# Patient Record
Sex: FEMALE | Race: BLACK OR AFRICAN AMERICAN | Employment: OTHER | URBAN - NONMETROPOLITAN AREA
[De-identification: names, ages, dates, MRNs, and addresses within clinical notes are randomized per-mention and may not be internally consistent; named-entity substitution may affect disease eponyms.]

---

## 2018-03-12 ENCOUNTER — HOSPITAL ENCOUNTER (OUTPATIENT)
Dept: MRI IMAGING | Age: 73
Discharge: HOME | End: 2018-03-12
Payer: MEDICARE

## 2018-03-12 DIAGNOSIS — Z00.6 EXAMINATION FOR NORMAL COMPARISON FOR CLINICAL RESEARCH: ICD-10-CM

## 2018-03-12 PROCEDURE — 3209999900 MRI COMPARISON OF OUTSIDE FILMS

## 2018-03-19 ENCOUNTER — HOSPITAL ENCOUNTER (OUTPATIENT)
Dept: INTERVENTIONAL RADIOLOGY/VASCULAR | Age: 73
Discharge: HOME | End: 2018-03-19
Payer: MEDICARE

## 2018-03-19 DIAGNOSIS — M54.6 ACUTE MIDLINE THORACIC BACK PAIN: ICD-10-CM

## 2018-03-19 PROCEDURE — 76000 FLUOROSCOPY <1 HR PHYS/QHP: CPT

## 2018-03-19 NOTE — PROGRESS NOTES
Juliana Ramirez is a 68 y.o. female   Was evaluated for possible vertebroplasty/augmentation and is an appropriate candidate for vertebroplasty of T5 and T6, but wishes to ponder the procedure.      SEE DICTATED REPORT FOR COMPLETE [address] 3/19/2018 2:57 PM

## 2018-04-26 RX ORDER — CEFAZOLIN SODIUM 1 G/50ML
1 INJECTION, SOLUTION INTRAVENOUS
Status: CANCELLED | OUTPATIENT
Start: 2018-04-26

## 2018-04-26 RX ORDER — SODIUM CHLORIDE 450 MG/100ML
INJECTION, SOLUTION INTRAVENOUS CONTINUOUS
Status: CANCELLED | OUTPATIENT
Start: 2018-04-26

## 2018-04-26 RX ORDER — MIDAZOLAM HYDROCHLORIDE 1 MG/ML
1 INJECTION INTRAMUSCULAR; INTRAVENOUS ONCE
Status: CANCELLED | OUTPATIENT
Start: 2018-04-26 | End: 2018-04-26

## 2018-04-26 RX ORDER — FENTANYL CITRATE 50 UG/ML
50 INJECTION, SOLUTION INTRAMUSCULAR; INTRAVENOUS ONCE
Status: CANCELLED | OUTPATIENT
Start: 2018-04-26 | End: 2018-04-26

## 2018-04-27 ENCOUNTER — HOSPITAL ENCOUNTER (OUTPATIENT)
Dept: MRI IMAGING | Age: 73
Discharge: HOME | End: 2018-04-27
Payer: MEDICARE

## 2018-04-27 ENCOUNTER — HOSPITAL ENCOUNTER (OUTPATIENT)
Dept: INTERVENTIONAL RADIOLOGY/VASCULAR | Age: 73
Discharge: HOME | End: 2018-04-27
Payer: MEDICARE

## 2018-04-27 VITALS
RESPIRATION RATE: 18 BRPM | TEMPERATURE: 98.4 F | HEART RATE: 75 BPM | SYSTOLIC BLOOD PRESSURE: 116 MMHG | OXYGEN SATURATION: 95 % | WEIGHT: 211 LBS | DIASTOLIC BLOOD PRESSURE: 71 MMHG

## 2018-04-27 DIAGNOSIS — M54.9 OTHER CHRONIC BACK PAIN: ICD-10-CM

## 2018-04-27 DIAGNOSIS — G89.29 OTHER CHRONIC BACK PAIN: ICD-10-CM

## 2018-04-27 LAB
HCT VFR BLD CALC: 37.6 % (ref 37–47)
HEMOGLOBIN: 12.3 GM/DL (ref 12–16)
MCH RBC QN AUTO: 23.7 PG (ref 27–31)
MCHC RBC AUTO-ENTMCNC: 32.8 GM/DL (ref 33–37)
MCV RBC AUTO: 72.1 FL (ref 81–99)
PDW BLD-RTO: 15.5 % (ref 11.5–14.5)
PLATELET # BLD: 199 THOU/MM3 (ref 130–400)
PMV BLD AUTO: 9.9 FL (ref 7.4–10.4)
RBC # BLD: 5.22 MILL/MM3 (ref 4.2–5.4)
WBC # BLD: 7.8 THOU/MM3 (ref 4.8–10.8)

## 2018-04-27 PROCEDURE — 36415 COLL VENOUS BLD VENIPUNCTURE: CPT

## 2018-04-27 PROCEDURE — 2580000003 HC RX 258: Performed by: RADIOLOGY

## 2018-04-27 PROCEDURE — 85027 COMPLETE CBC AUTOMATED: CPT

## 2018-04-27 PROCEDURE — 72146 MRI CHEST SPINE W/O DYE: CPT

## 2018-04-27 RX ORDER — ALBUTEROL SULFATE 2.5 MG/3ML
2.5 SOLUTION RESPIRATORY (INHALATION) EVERY 6 HOURS PRN
COMMUNITY

## 2018-04-27 RX ORDER — DILTIAZEM HYDROCHLORIDE 240 MG/1
240 CAPSULE, COATED, EXTENDED RELEASE ORAL DAILY
COMMUNITY

## 2018-04-27 RX ORDER — HYDROCHLOROTHIAZIDE 12.5 MG/1
12 CAPSULE, GELATIN COATED ORAL DAILY
Status: ON HOLD | COMMUNITY
End: 2018-06-25 | Stop reason: HOSPADM

## 2018-04-27 RX ORDER — MIRTAZAPINE 15 MG/1
15 TABLET, FILM COATED ORAL NIGHTLY
COMMUNITY

## 2018-04-27 RX ORDER — SODIUM CHLORIDE 450 MG/100ML
INJECTION, SOLUTION INTRAVENOUS CONTINUOUS
Status: DISCONTINUED | OUTPATIENT
Start: 2018-04-27 | End: 2018-04-28 | Stop reason: HOSPADM

## 2018-04-27 RX ORDER — FENTANYL CITRATE 50 UG/ML
50 INJECTION, SOLUTION INTRAMUSCULAR; INTRAVENOUS ONCE
Status: DISCONTINUED | OUTPATIENT
Start: 2018-04-27 | End: 2018-04-28 | Stop reason: HOSPADM

## 2018-04-27 RX ORDER — MIDAZOLAM HYDROCHLORIDE 1 MG/ML
1 INJECTION INTRAMUSCULAR; INTRAVENOUS ONCE
Status: DISCONTINUED | OUTPATIENT
Start: 2018-04-27 | End: 2018-04-28 | Stop reason: HOSPADM

## 2018-04-27 RX ORDER — CLONIDINE HYDROCHLORIDE 0.2 MG/1
0.2 TABLET ORAL 2 TIMES DAILY
Status: ON HOLD | COMMUNITY
End: 2018-06-25 | Stop reason: HOSPADM

## 2018-04-27 RX ORDER — CEFAZOLIN SODIUM 1 G/50ML
1 INJECTION, SOLUTION INTRAVENOUS
Status: DISCONTINUED | OUTPATIENT
Start: 2018-04-27 | End: 2018-04-28 | Stop reason: HOSPADM

## 2018-04-27 RX ADMIN — SODIUM CHLORIDE: 4.5 INJECTION, SOLUTION INTRAVENOUS at 10:45

## 2018-04-27 ASSESSMENT — PAIN SCALES - GENERAL: PAINLEVEL_OUTOF10: 0

## 2018-06-22 ENCOUNTER — HOSPITAL ENCOUNTER (INPATIENT)
Age: 73
LOS: 3 days | Discharge: HOME | DRG: 191 | End: 2018-06-25
Attending: INTERNAL MEDICINE | Admitting: INTERNAL MEDICINE
Payer: MEDICARE

## 2018-06-22 DIAGNOSIS — E11.65 TYPE 2 DIABETES MELLITUS WITH HYPERGLYCEMIA, WITHOUT LONG-TERM CURRENT USE OF INSULIN (HCC): Primary | ICD-10-CM

## 2018-06-22 DIAGNOSIS — J44.1 COPD WITH ACUTE EXACERBATION (HCC): ICD-10-CM

## 2018-06-22 DIAGNOSIS — F17.200 TOBACCO USE DISORDER: ICD-10-CM

## 2018-06-22 DIAGNOSIS — R93.89 ABNORMAL CHEST X-RAY: ICD-10-CM

## 2018-06-22 PROBLEM — M81.0 OSTEOPOROSIS: Status: ACTIVE | Noted: 2018-06-22

## 2018-06-22 PROBLEM — M06.9 RA (RHEUMATOID ARTHRITIS) (HCC): Status: ACTIVE | Noted: 2018-06-22

## 2018-06-22 PROBLEM — J45.909 ASTHMA: Status: ACTIVE | Noted: 2018-06-22

## 2018-06-22 PROBLEM — E66.01 MORBID OBESITY (HCC): Status: ACTIVE | Noted: 2018-06-22

## 2018-06-22 PROBLEM — I10 ESSENTIAL HYPERTENSION: Status: ACTIVE | Noted: 2018-06-22

## 2018-06-22 PROBLEM — L03.90 CELLULITIS: Status: ACTIVE | Noted: 2018-06-22

## 2018-06-22 PROBLEM — I73.9 PVD (PERIPHERAL VASCULAR DISEASE) (HCC): Status: ACTIVE | Noted: 2018-06-22

## 2018-06-22 PROBLEM — I21.4 NSTEMI (NON-ST ELEVATED MYOCARDIAL INFARCTION) (HCC): Status: ACTIVE | Noted: 2018-06-22

## 2018-06-22 LAB
EKG ATRIAL RATE: 113 BPM
EKG P AXIS: 43 DEGREES
EKG P-R INTERVAL: 172 MS
EKG Q-T INTERVAL: 326 MS
EKG QRS DURATION: 72 MS
EKG QTC CALCULATION (BAZETT): 447 MS
EKG T AXIS: 21 DEGREES
EKG VENTRICULAR RATE: 113 BPM
TROPONIN T: 0.02 NG/ML

## 2018-06-22 PROCEDURE — 2140000000 HC CCU INTERMEDIATE R&B

## 2018-06-22 PROCEDURE — 2580000003 HC RX 258: Performed by: INTERNAL MEDICINE

## 2018-06-22 PROCEDURE — 93005 ELECTROCARDIOGRAM TRACING: CPT | Performed by: INTERNAL MEDICINE

## 2018-06-22 PROCEDURE — 99223 1ST HOSP IP/OBS HIGH 75: CPT | Performed by: INTERNAL MEDICINE

## 2018-06-22 PROCEDURE — 36415 COLL VENOUS BLD VENIPUNCTURE: CPT

## 2018-06-22 PROCEDURE — 6370000000 HC RX 637 (ALT 250 FOR IP): Performed by: INTERNAL MEDICINE

## 2018-06-22 PROCEDURE — 84484 ASSAY OF TROPONIN QUANT: CPT

## 2018-06-22 RX ORDER — POLYETHYLENE GLYCOL 3350 17 G/17G
17 POWDER, FOR SOLUTION ORAL DAILY
COMMUNITY

## 2018-06-22 RX ORDER — TRAMADOL HYDROCHLORIDE 50 MG/1
50 TABLET ORAL EVERY 6 HOURS PRN
COMMUNITY

## 2018-06-22 RX ORDER — FEXOFENADINE HYDROCHLORIDE 60 MG/1
30 TABLET, FILM COATED ORAL DAILY
Status: DISCONTINUED | OUTPATIENT
Start: 2018-06-23 | End: 2018-06-22 | Stop reason: CLARIF

## 2018-06-22 RX ORDER — CETIRIZINE HYDROCHLORIDE 5 MG/1
5 TABLET ORAL DAILY
Status: DISCONTINUED | OUTPATIENT
Start: 2018-06-23 | End: 2018-06-25 | Stop reason: HOSPADM

## 2018-06-22 RX ORDER — MONTELUKAST SODIUM 10 MG/1
10 TABLET ORAL NIGHTLY
COMMUNITY

## 2018-06-22 RX ORDER — GABAPENTIN 100 MG/1
200 CAPSULE ORAL 3 TIMES DAILY
COMMUNITY

## 2018-06-22 RX ORDER — MONTELUKAST SODIUM 10 MG/1
10 TABLET ORAL NIGHTLY
Status: DISCONTINUED | OUTPATIENT
Start: 2018-06-22 | End: 2018-06-25 | Stop reason: HOSPADM

## 2018-06-22 RX ORDER — SODIUM CHLORIDE 0.9 % (FLUSH) 0.9 %
10 SYRINGE (ML) INJECTION EVERY 12 HOURS SCHEDULED
Status: DISCONTINUED | OUTPATIENT
Start: 2018-06-22 | End: 2018-06-25 | Stop reason: HOSPADM

## 2018-06-22 RX ORDER — CLONIDINE HYDROCHLORIDE 0.2 MG/1
0.2 TABLET ORAL 2 TIMES DAILY
Status: DISCONTINUED | OUTPATIENT
Start: 2018-06-22 | End: 2018-06-25 | Stop reason: HOSPADM

## 2018-06-22 RX ORDER — TRAMADOL HYDROCHLORIDE 50 MG/1
50 TABLET ORAL EVERY 6 HOURS PRN
Status: DISCONTINUED | OUTPATIENT
Start: 2018-06-22 | End: 2018-06-25 | Stop reason: HOSPADM

## 2018-06-22 RX ORDER — ACETAMINOPHEN 325 MG/1
650 TABLET ORAL EVERY 4 HOURS PRN
Status: DISCONTINUED | OUTPATIENT
Start: 2018-06-22 | End: 2018-06-25 | Stop reason: HOSPADM

## 2018-06-22 RX ORDER — ALBUTEROL SULFATE 2.5 MG/3ML
2.5 SOLUTION RESPIRATORY (INHALATION) EVERY 6 HOURS PRN
Status: DISCONTINUED | OUTPATIENT
Start: 2018-06-22 | End: 2018-06-25 | Stop reason: HOSPADM

## 2018-06-22 RX ORDER — MIRTAZAPINE 15 MG/1
15 TABLET, FILM COATED ORAL NIGHTLY
Status: DISCONTINUED | OUTPATIENT
Start: 2018-06-22 | End: 2018-06-25 | Stop reason: HOSPADM

## 2018-06-22 RX ORDER — HYDROCODONE BITARTRATE AND ACETAMINOPHEN 5; 325 MG/1; MG/1
1 TABLET ORAL EVERY 4 HOURS PRN
Status: DISCONTINUED | OUTPATIENT
Start: 2018-06-22 | End: 2018-06-25 | Stop reason: HOSPADM

## 2018-06-22 RX ORDER — DILTIAZEM HYDROCHLORIDE 240 MG/1
240 CAPSULE, COATED, EXTENDED RELEASE ORAL DAILY
Status: DISCONTINUED | OUTPATIENT
Start: 2018-06-22 | End: 2018-06-25 | Stop reason: HOSPADM

## 2018-06-22 RX ORDER — ATORVASTATIN CALCIUM 20 MG/1
20 TABLET, FILM COATED ORAL NIGHTLY
Status: DISCONTINUED | OUTPATIENT
Start: 2018-06-22 | End: 2018-06-25 | Stop reason: HOSPADM

## 2018-06-22 RX ORDER — HYDROCODONE BITARTRATE AND ACETAMINOPHEN 5; 325 MG/1; MG/1
1 TABLET ORAL EVERY 4 HOURS PRN
Status: DISCONTINUED | OUTPATIENT
Start: 2018-06-22 | End: 2018-06-22 | Stop reason: SDUPTHER

## 2018-06-22 RX ORDER — ONDANSETRON 2 MG/ML
4 INJECTION INTRAMUSCULAR; INTRAVENOUS EVERY 6 HOURS PRN
Status: DISCONTINUED | OUTPATIENT
Start: 2018-06-22 | End: 2018-06-25 | Stop reason: HOSPADM

## 2018-06-22 RX ORDER — ASPIRIN 81 MG/1
81 TABLET, CHEWABLE ORAL DAILY
Status: DISCONTINUED | OUTPATIENT
Start: 2018-06-23 | End: 2018-06-25 | Stop reason: HOSPADM

## 2018-06-22 RX ORDER — ALBUTEROL SULFATE 90 UG/1
2 AEROSOL, METERED RESPIRATORY (INHALATION) EVERY 4 HOURS PRN
Status: DISCONTINUED | OUTPATIENT
Start: 2018-06-22 | End: 2018-06-25 | Stop reason: HOSPADM

## 2018-06-22 RX ORDER — ALBUTEROL SULFATE 90 UG/1
2 AEROSOL, METERED RESPIRATORY (INHALATION) EVERY 4 HOURS PRN
COMMUNITY

## 2018-06-22 RX ORDER — SODIUM CHLORIDE 0.9 % (FLUSH) 0.9 %
10 SYRINGE (ML) INJECTION PRN
Status: DISCONTINUED | OUTPATIENT
Start: 2018-06-22 | End: 2018-06-25 | Stop reason: HOSPADM

## 2018-06-22 RX ORDER — GABAPENTIN 100 MG/1
200 CAPSULE ORAL 3 TIMES DAILY
Status: DISCONTINUED | OUTPATIENT
Start: 2018-06-22 | End: 2018-06-25 | Stop reason: HOSPADM

## 2018-06-22 RX ADMIN — CLONIDINE HYDROCHLORIDE 0.2 MG: 0.2 TABLET ORAL at 22:28

## 2018-06-22 RX ADMIN — GABAPENTIN 200 MG: 100 CAPSULE ORAL at 22:28

## 2018-06-22 RX ADMIN — DILTIAZEM HYDROCHLORIDE 240 MG: 240 CAPSULE, COATED, EXTENDED RELEASE ORAL at 22:28

## 2018-06-22 RX ADMIN — Medication 10 ML: at 22:31

## 2018-06-22 RX ADMIN — MIRTAZAPINE 15 MG: 15 TABLET, FILM COATED ORAL at 22:28

## 2018-06-22 RX ADMIN — MONTELUKAST SODIUM 10 MG: 10 TABLET, FILM COATED ORAL at 22:28

## 2018-06-22 RX ADMIN — ATORVASTATIN CALCIUM 20 MG: 20 TABLET, FILM COATED ORAL at 22:28

## 2018-06-22 ASSESSMENT — PAIN SCALES - GENERAL: PAINLEVEL_OUTOF10: 0

## 2018-06-23 ENCOUNTER — APPOINTMENT (OUTPATIENT)
Dept: GENERAL RADIOLOGY | Age: 73
DRG: 191 | End: 2018-06-23
Attending: INTERNAL MEDICINE
Payer: MEDICARE

## 2018-06-23 PROBLEM — J20.8 ACUTE BRONCHITIS DUE TO OTHER SPECIFIED ORGANISMS: Status: ACTIVE | Noted: 2018-06-23

## 2018-06-23 PROBLEM — J44.9 COPD (CHRONIC OBSTRUCTIVE PULMONARY DISEASE) (HCC): Status: ACTIVE | Noted: 2018-06-23

## 2018-06-23 PROBLEM — R73.9 HYPERGLYCEMIA, UNSPECIFIED: Status: ACTIVE | Noted: 2018-06-23

## 2018-06-23 PROBLEM — F17.200 TOBACCO USE DISORDER: Status: ACTIVE | Noted: 2018-06-23

## 2018-06-23 LAB
ALLEN TEST: POSITIVE
ANION GAP SERPL CALCULATED.3IONS-SCNC: 13 MEQ/L (ref 8–16)
ANION GAP SERPL CALCULATED.3IONS-SCNC: 17 MEQ/L (ref 8–16)
ANISOCYTOSIS: ABNORMAL
AVERAGE GLUCOSE: 153 MG/DL (ref 70–126)
BACTERIA: ABNORMAL
BASE EXCESS (CALCULATED): 4.3 MMOL/L (ref -2.5–2.5)
BASOPHILS # BLD: 0.1 %
BASOPHILS ABSOLUTE: 0 THOU/MM3 (ref 0–0.1)
BILIRUBIN URINE: NEGATIVE
BLOOD, URINE: NEGATIVE
BUN BLDV-MCNC: 13 MG/DL (ref 7–22)
BUN BLDV-MCNC: 14 MG/DL (ref 7–22)
CALCIUM SERPL-MCNC: 10.1 MG/DL (ref 8.5–10.5)
CALCIUM SERPL-MCNC: 9.9 MG/DL (ref 8.5–10.5)
CASTS: ABNORMAL /LPF
CASTS: ABNORMAL /LPF
CHARACTER, URINE: CLEAR
CHLORIDE BLD-SCNC: 100 MEQ/L (ref 98–111)
CHLORIDE BLD-SCNC: 96 MEQ/L (ref 98–111)
CHOLESTEROL, TOTAL: 177 MG/DL (ref 100–199)
CO2: 25 MEQ/L (ref 23–33)
CO2: 27 MEQ/L (ref 23–33)
COLLECTED BY:: ABNORMAL
COLOR: YELLOW
CREAT SERPL-MCNC: 0.7 MG/DL (ref 0.4–1.2)
CREAT SERPL-MCNC: 0.8 MG/DL (ref 0.4–1.2)
CRYSTALS: ABNORMAL
DEVICE: ABNORMAL
EOSINOPHIL # BLD: 0 %
EOSINOPHILS ABSOLUTE: 0 THOU/MM3 (ref 0–0.4)
EPITHELIAL CELLS, UA: ABNORMAL /HPF
GFR SERPL CREATININE-BSD FRML MDRD: 85 ML/MIN/1.73M2
GFR SERPL CREATININE-BSD FRML MDRD: > 90 ML/MIN/1.73M2
GLUCOSE BLD-MCNC: 175 MG/DL (ref 70–108)
GLUCOSE BLD-MCNC: 186 MG/DL (ref 70–108)
GLUCOSE BLD-MCNC: 222 MG/DL (ref 70–108)
GLUCOSE BLD-MCNC: 229 MG/DL (ref 70–108)
GLUCOSE, URINE: NEGATIVE MG/DL
HBA1C MFR BLD: 7.1 % (ref 4.4–6.4)
HCO3: 30 MMOL/L (ref 23–28)
HCT VFR BLD CALC: 40.1 % (ref 37–47)
HCT VFR BLD CALC: 40.3 % (ref 37–47)
HDLC SERPL-MCNC: 53 MG/DL
HEMOGLOBIN: 12.8 GM/DL (ref 12–16)
HEMOGLOBIN: 12.8 GM/DL (ref 12–16)
HYPOCHROMIA: ABNORMAL
KETONES, URINE: NEGATIVE
LDL CHOLESTEROL CALCULATED: 114 MG/DL
LEUKOCYTE EST, POC: NEGATIVE
LV EF: 60 %
LVEF MODALITY: NORMAL
LYMPHOCYTES # BLD: 13.1 %
LYMPHOCYTES ABSOLUTE: 1.1 THOU/MM3 (ref 1–4.8)
MCH RBC QN AUTO: 23.1 PG (ref 27–31)
MCH RBC QN AUTO: 23.3 PG (ref 27–31)
MCHC RBC AUTO-ENTMCNC: 31.8 GM/DL (ref 33–37)
MCHC RBC AUTO-ENTMCNC: 32 GM/DL (ref 33–37)
MCV RBC AUTO: 72.8 FL (ref 81–99)
MCV RBC AUTO: 73 FL (ref 81–99)
MICROCYTES: ABNORMAL
MISCELLANEOUS LAB TEST RESULT: ABNORMAL
MONOCYTES # BLD: 0.5 %
MONOCYTES ABSOLUTE: 0 THOU/MM3 (ref 0.4–1.3)
MRSA SCREEN RT-PCR: NEGATIVE
NITRITE, URINE: NEGATIVE
NUCLEATED RED BLOOD CELLS: 0 /100 WBC
O2 SATURATION: 91 %
OVALOCYTES: ABNORMAL
PCO2: 46 MMHG (ref 35–45)
PDW BLD-RTO: 16 % (ref 11.5–14.5)
PDW BLD-RTO: 16.5 % (ref 11.5–14.5)
PH BLOOD GAS: 7.42 (ref 7.35–7.45)
PH UA: 6
PLATELET # BLD: 226 THOU/MM3 (ref 130–400)
PLATELET # BLD: 231 THOU/MM3 (ref 130–400)
PLATELET ESTIMATE: ADEQUATE
PMV BLD AUTO: 11.1 FL (ref 7.4–10.4)
PMV BLD AUTO: 11.2 FL (ref 7.4–10.4)
PO2: 61 MMHG (ref 71–104)
POTASSIUM REFLEX MAGNESIUM: 4.7 MEQ/L (ref 3.5–5.2)
POTASSIUM SERPL-SCNC: 4.2 MEQ/L (ref 3.5–5.2)
PROCALCITONIN: 0.05 NG/ML (ref 0.01–0.09)
PROTEIN UA: 30 MG/DL
RBC # BLD: 5.5 MILL/MM3 (ref 4.2–5.4)
RBC # BLD: 5.54 MILL/MM3 (ref 4.2–5.4)
RBC URINE: ABNORMAL /HPF
RENAL EPITHELIAL, UA: ABNORMAL
SCAN OF BLOOD SMEAR: NORMAL
SEG NEUTROPHILS: 86.3 %
SEGMENTED NEUTROPHILS ABSOLUTE COUNT: 7.4 THOU/MM3 (ref 1.8–7.7)
SODIUM BLD-SCNC: 138 MEQ/L (ref 135–145)
SODIUM BLD-SCNC: 140 MEQ/L (ref 135–145)
SOURCE, BLOOD GAS: ABNORMAL
SPECIFIC GRAVITY UA: 1.01 (ref 1–1.03)
TARGET CELLS: ABNORMAL
TRIGL SERPL-MCNC: 50 MG/DL (ref 0–199)
TROPONIN T: < 0.01 NG/ML
TROPONIN T: < 0.01 NG/ML
UROBILINOGEN, URINE: 0.2 EU/DL
WBC # BLD: 10.1 THOU/MM3 (ref 4.8–10.8)
WBC # BLD: 8.6 THOU/MM3 (ref 4.8–10.8)
WBC UA: ABNORMAL /HPF
YEAST: ABNORMAL

## 2018-06-23 PROCEDURE — 6360000002 HC RX W HCPCS: Performed by: INTERNAL MEDICINE

## 2018-06-23 PROCEDURE — 94640 AIRWAY INHALATION TREATMENT: CPT

## 2018-06-23 PROCEDURE — 82948 REAGENT STRIP/BLOOD GLUCOSE: CPT

## 2018-06-23 PROCEDURE — 6370000000 HC RX 637 (ALT 250 FOR IP): Performed by: INTERNAL MEDICINE

## 2018-06-23 PROCEDURE — G8987 SELF CARE CURRENT STATUS: HCPCS

## 2018-06-23 PROCEDURE — G0009 ADMIN PNEUMOCOCCAL VACCINE: HCPCS | Performed by: INTERNAL MEDICINE

## 2018-06-23 PROCEDURE — 36600 WITHDRAWAL OF ARTERIAL BLOOD: CPT

## 2018-06-23 PROCEDURE — 85027 COMPLETE CBC AUTOMATED: CPT

## 2018-06-23 PROCEDURE — 97166 OT EVAL MOD COMPLEX 45 MIN: CPT

## 2018-06-23 PROCEDURE — 84145 PROCALCITONIN (PCT): CPT

## 2018-06-23 PROCEDURE — 93306 TTE W/DOPPLER COMPLETE: CPT

## 2018-06-23 PROCEDURE — 36415 COLL VENOUS BLD VENIPUNCTURE: CPT

## 2018-06-23 PROCEDURE — A9500 TC99M SESTAMIBI: HCPCS | Performed by: INTERNAL MEDICINE

## 2018-06-23 PROCEDURE — 80048 BASIC METABOLIC PNL TOTAL CA: CPT

## 2018-06-23 PROCEDURE — 90670 PCV13 VACCINE IM: CPT | Performed by: INTERNAL MEDICINE

## 2018-06-23 PROCEDURE — 99223 1ST HOSP IP/OBS HIGH 75: CPT | Performed by: INTERNAL MEDICINE

## 2018-06-23 PROCEDURE — 93010 ELECTROCARDIOGRAM REPORT: CPT | Performed by: INTERNAL MEDICINE

## 2018-06-23 PROCEDURE — 2580000003 HC RX 258: Performed by: INTERNAL MEDICINE

## 2018-06-23 PROCEDURE — 82803 BLOOD GASES ANY COMBINATION: CPT

## 2018-06-23 PROCEDURE — 83036 HEMOGLOBIN GLYCOSYLATED A1C: CPT

## 2018-06-23 PROCEDURE — 3430000000 HC RX DIAGNOSTIC RADIOPHARMACEUTICAL: Performed by: INTERNAL MEDICINE

## 2018-06-23 PROCEDURE — 99233 SBSQ HOSP IP/OBS HIGH 50: CPT | Performed by: INTERNAL MEDICINE

## 2018-06-23 PROCEDURE — 2140000000 HC CCU INTERMEDIATE R&B

## 2018-06-23 PROCEDURE — 6360000002 HC RX W HCPCS

## 2018-06-23 PROCEDURE — 84484 ASSAY OF TROPONIN QUANT: CPT

## 2018-06-23 PROCEDURE — 97535 SELF CARE MNGMENT TRAINING: CPT

## 2018-06-23 PROCEDURE — 78452 HT MUSCLE IMAGE SPECT MULT: CPT

## 2018-06-23 PROCEDURE — 93017 CV STRESS TEST TRACING ONLY: CPT

## 2018-06-23 PROCEDURE — 87086 URINE CULTURE/COLONY COUNT: CPT

## 2018-06-23 PROCEDURE — 80061 LIPID PANEL: CPT

## 2018-06-23 PROCEDURE — 82272 OCCULT BLD FECES 1-3 TESTS: CPT

## 2018-06-23 PROCEDURE — 81001 URINALYSIS AUTO W/SCOPE: CPT

## 2018-06-23 PROCEDURE — G8988 SELF CARE GOAL STATUS: HCPCS

## 2018-06-23 PROCEDURE — 85025 COMPLETE CBC W/AUTO DIFF WBC: CPT

## 2018-06-23 PROCEDURE — 87641 MR-STAPH DNA AMP PROBE: CPT

## 2018-06-23 PROCEDURE — 71045 X-RAY EXAM CHEST 1 VIEW: CPT

## 2018-06-23 RX ORDER — NICOTINE POLACRILEX 4 MG
15 LOZENGE BUCCAL PRN
Status: DISCONTINUED | OUTPATIENT
Start: 2018-06-23 | End: 2018-06-25 | Stop reason: HOSPADM

## 2018-06-23 RX ORDER — DEXTROSE MONOHYDRATE 50 MG/ML
100 INJECTION, SOLUTION INTRAVENOUS PRN
Status: DISCONTINUED | OUTPATIENT
Start: 2018-06-23 | End: 2018-06-25 | Stop reason: HOSPADM

## 2018-06-23 RX ORDER — NICOTINE 21 MG/24HR
1 PATCH, TRANSDERMAL 24 HOURS TRANSDERMAL DAILY
Status: DISCONTINUED | OUTPATIENT
Start: 2018-06-23 | End: 2018-06-25 | Stop reason: HOSPADM

## 2018-06-23 RX ORDER — DEXTROSE MONOHYDRATE 25 G/50ML
12.5 INJECTION, SOLUTION INTRAVENOUS PRN
Status: DISCONTINUED | OUTPATIENT
Start: 2018-06-23 | End: 2018-06-25 | Stop reason: HOSPADM

## 2018-06-23 RX ORDER — DOXYCYCLINE HYCLATE 100 MG
100 TABLET ORAL EVERY 12 HOURS SCHEDULED
Status: DISCONTINUED | OUTPATIENT
Start: 2018-06-23 | End: 2018-06-25 | Stop reason: HOSPADM

## 2018-06-23 RX ADMIN — ALBUTEROL SULFATE 2.5 MG: 2.5 SOLUTION RESPIRATORY (INHALATION) at 23:07

## 2018-06-23 RX ADMIN — ATORVASTATIN CALCIUM 20 MG: 20 TABLET, FILM COATED ORAL at 21:24

## 2018-06-23 RX ADMIN — Medication 2 PUFF: at 19:49

## 2018-06-23 RX ADMIN — Medication 10 ML: at 13:03

## 2018-06-23 RX ADMIN — Medication 2 UNITS: at 22:04

## 2018-06-23 RX ADMIN — ASPIRIN 81 MG 81 MG: 81 TABLET ORAL at 09:14

## 2018-06-23 RX ADMIN — DILTIAZEM HYDROCHLORIDE 240 MG: 240 CAPSULE, COATED, EXTENDED RELEASE ORAL at 09:10

## 2018-06-23 RX ADMIN — SODIUM CHLORIDE 1.5 G: 900 INJECTION INTRAVENOUS at 06:00

## 2018-06-23 RX ADMIN — DOXYCYCLINE HYCLATE 100 MG: 100 TABLET, COATED ORAL at 21:24

## 2018-06-23 RX ADMIN — CLONIDINE HYDROCHLORIDE 0.2 MG: 0.2 TABLET ORAL at 09:10

## 2018-06-23 RX ADMIN — Medication 10 ML: at 21:24

## 2018-06-23 RX ADMIN — Medication 35 MILLICURIE: at 11:38

## 2018-06-23 RX ADMIN — SODIUM CHLORIDE 1.5 G: 900 INJECTION INTRAVENOUS at 13:02

## 2018-06-23 RX ADMIN — CLONIDINE HYDROCHLORIDE 0.2 MG: 0.2 TABLET ORAL at 21:24

## 2018-06-23 RX ADMIN — Medication 11 MILLICURIE: at 10:54

## 2018-06-23 RX ADMIN — SODIUM CHLORIDE 1.5 G: 900 INJECTION INTRAVENOUS at 00:00

## 2018-06-23 RX ADMIN — MIRTAZAPINE 15 MG: 15 TABLET, FILM COATED ORAL at 21:24

## 2018-06-23 RX ADMIN — CETIRIZINE HYDROCHLORIDE 5 MG: 5 TABLET, FILM COATED ORAL at 13:03

## 2018-06-23 RX ADMIN — GABAPENTIN 200 MG: 100 CAPSULE ORAL at 21:24

## 2018-06-23 RX ADMIN — ENOXAPARIN SODIUM 40 MG: 40 INJECTION SUBCUTANEOUS at 21:24

## 2018-06-23 RX ADMIN — MONTELUKAST SODIUM 10 MG: 10 TABLET, FILM COATED ORAL at 21:24

## 2018-06-23 RX ADMIN — GABAPENTIN 200 MG: 100 CAPSULE ORAL at 09:10

## 2018-06-23 RX ADMIN — PNEUMOCOCCAL 13-VALENT CONJUGATE VACCINE 0.5 ML: 2.2; 2.2; 2.2; 2.2; 2.2; 4.4; 2.2; 2.2; 2.2; 2.2; 2.2; 2.2; 2.2 INJECTION, SUSPENSION INTRAMUSCULAR at 13:08

## 2018-06-23 RX ADMIN — GABAPENTIN 200 MG: 100 CAPSULE ORAL at 13:53

## 2018-06-23 RX ADMIN — CEFTRIAXONE SODIUM 1 G: 1 INJECTION, POWDER, FOR SOLUTION INTRAMUSCULAR; INTRAVENOUS at 21:23

## 2018-06-23 ASSESSMENT — ENCOUNTER SYMPTOMS
SORE THROAT: 0
SINUS PRESSURE: 0
COLOR CHANGE: 0
VOICE CHANGE: 0
STRIDOR: 0
RECTAL PAIN: 0
TROUBLE SWALLOWING: 0
CHOKING: 0
NAUSEA: 0
SHORTNESS OF BREATH: 1
COUGH: 1
BACK PAIN: 0
RHINORRHEA: 0
VOMITING: 0
WHEEZING: 0
APNEA: 0
DIARRHEA: 0
CHEST TIGHTNESS: 0
EYE ITCHING: 0
ANAL BLEEDING: 0
ABDOMINAL PAIN: 0
CONSTIPATION: 0
ABDOMINAL DISTENTION: 0
EYE REDNESS: 0

## 2018-06-23 ASSESSMENT — PAIN SCALES - GENERAL
PAINLEVEL_OUTOF10: 0

## 2018-06-23 NOTE — CONSULTS
Consults                                      CONSULTATION NOTE :ID       Patient - Nick Emery,  Age - 68 y.o.    - 1945      Room Number - 3B-35/035-A   N -  [id number]   [facility]  Date of Admission -  2018  8:32 PM  Patient's PCP: Aliza Ac MD     Requesting Physician: Alee Titus MD    REASON FOR CONSULTATION   Evaluate for leg cellulites    HISTORY OF PRESENT ILLNESS       This is a very pleasant 68 y.o. female who was admitted to the hospital with a chief complaints of cough and shortness of breath. She is a chronic smoker and has been having more shortness of breath and cough, she had wheezes. She has osteoporosis and rheumatoid arthritis. I was asked to see this patient because she was noted to have open sore on her left shin and the legs were noted to be swollen. She denies any fever or chills. She was started on antibiotics. She had a stress test, the result is pending. I was asked to evaluate her legs.     PAST MEDICAL AND SURGICAL HISTORY       Past Medical History:   Diagnosis Date    Arthritis     Asthma     Hypertension     Osteoporosis     Rheumatoid arthritis ([address]        Past Surgical History:   Procedure Laterality Date    JOINT REPLACEMENT Bilateral          MEDICATIONS PRIOR TO ADMISSION:       Scheduled Meds:   cloNIDine  0.2 mg Oral BID    diltiazem  240 mg Oral Daily    gabapentin  200 mg Oral TID    mirtazapine  15 mg Oral Nightly    montelukast  10 mg Oral Nightly    sodium chloride flush  10 mL Intravenous 2 times per day    atorvastatin  20 mg Oral Nightly    aspirin  81 mg Oral Daily    enoxaparin  40 mg Subcutaneous Q24H    cetirizine  5 mg Oral Daily    mometasone-formoterol  2 puff Inhalation BID    ampicillin-sulbactam  1.5 g Intravenous Q6H     Continuous Infusions:  PRN Meds:albuterol, albuterol sulfate HFA, sodium chloride flush, acetaminophen, magnesium hydroxide, ondansetron, traMADol, HYDROcodone 5 mg - discoloration of the legs with scaly skin, she has open wound on her left shin, no drainage was noted it was not hot, nontender. Skin:  Warm and dry. CNS:oriented        LABS:     CBC:   Recent Labs      06/23/18   0041  06/23/18   0501   WBC  8.6  10.1   HGB  12.8  12.8   PLT  231  226     BMP:    Recent Labs      06/23/18   0041  06/23/18   0501   NA  138  140   K  4.2  4.7   CL  96*  100   CO2  25  27   BUN  13  14   CREATININE  0.7  0.8   GLUCOSE  229*  186*     Calcium:  Recent Labs      06/23/18   0501   CALCIUM  9.9   Lipids:   Recent Labs      06/23/18   0501   CHOL  177   TRIG  50   HDL  53   LDLCALC  114       Problem list of patient      Patient Active Problem List   Diagnosis Code    NSTEMI (non-ST elevated myocardial infarction) ([facility]) I21.4    Cellulitis L03.90    PVD (peripheral vascular disease) ([facility]) I73.9    RA (rheumatoid arthritis) ([facility] M06.9    Osteoporosis M81.0    Essential hypertension I10    Asthma J45.909    Morbid obesity ([facility]) E66.01           ASSESSMENT AND PLAN   · Cough with shortness of breath and wheeze: She has likely underlying COPD due to her chronic smoking, chest x-ray has been ordered and we will follow the result. · Osteoporosis  · Rheumatoid arthritis  · Hypertension  · Chronic leg swelling with discoloration: At this time I'm not convinced that that she has active infection. She will need a foam dressing and compression therapy to control the swelling  · Chronic smoking: Patient was strongly advised against smoking. · Need to rule out coronary artery disease: She has risks factors including rheumatoid arthritis hypertension and smoking. · We'll transition antibiotics to oral    Thank you Lei Jenkins MD for allowing me to participate in this patient's care.     Shaun Jane MD,FACP 6/23/2018 2:34 PM

## 2018-06-23 NOTE — PROGRESS NOTES
Safety:  Safety Devices in place: Yes  Type of devices: All fall risk precautions in place, Call light within reach    Plan:  Times per week: 5x  Current Treatment Recommendations: Endurance Training, Self-Care / ADL, Functional Mobility Training    Goals:  Patient goals : go home & not be so short of breath    Short term goals  Time Frame for Short term goals: 2 weeks  Short term goal 1: Complete BADL routine with S & good awareness of energy conservation strategies for increase tolerance  Short term goal 2: Id 2-3 energy conservation strategies with 0-2 vcs to increase ease of ADL routine  Short term goal 3: Complete  various t/fs including toilet with mod I & 0 vcs for safety  Long term goals  Time Frame for Long term goals : No LTG set d/t short ELOS    Evaluation Complexity: Based on the findings of patient history, examination, clinical presentation, and decision making during this evaluation, this patient is of medium complexity. OT G-codes  Functional Limitation: Self care  Self Care Current Status (): At least 40 percent but less than 60 percent impaired, limited or restricted  Self Care Goal Status ():  At least 40 percent but less than 60 percent impaired, limited or restricted  AM-[facility] Inpatient Daily Activity Raw Score: 19  AM-[facility] Inpatient ADL T-Scale Score : 40.22  ADL Inpatient CMS 0-100% Score: 42.8  ADL Inpatient CMS G-Code Modifier : CK

## 2018-06-23 NOTE — H&P
History & Physical        Patient:  Rambo Delgado  YOB: 1945    MRN: [id number]     Acct: [de-identified]    PCP: Rosmery Pathak MD    Date of Admission: 6/22/2018    Date of Service: Pt seen/examined on 6/22/18 and Admitted to Inpatient with expected LOS greater than two midnights due to medical therapy. Chief Complaint:  cough      History Of Present Illness: Pt is a 67 y/o with hx of Asthma, RA and osteoporosis. Pt  Went for 2 weeks f/u for bilat cellulitis and pcp thought she was SOB. Pt was sent to ER at OSH, she was found to have cough, SOB and ? Chest pain. CXR was reported as negative but trop was 0.025. EKG NSR. All other labs and vitals normal.  Pt was transferred here for evaluation of NSTEMI. Here, she denies chest pain, has cough, no sob, no fever, no n/v and no diarrhea but occasional constipation. Pt also has bilat cellulitis, concerning for PVD. Pt did not take her meds today. Past Medical History:          Diagnosis Date    Arthritis     Asthma     Hypertension     Osteoporosis     Rheumatoid [facility] ([facility]        Past Surgical History:          Procedure Laterality Date    JOINT REPLACEMENT Bilateral 2004       Medications Prior to Admission:      Prior to Admission medications    Medication Sig Start Date End Date Taking? Authorizing Provider   fluticasone-salmeterol (ADVAIR) 250-50 MCG/DOSE AEPB Inhale 1 puff into the lungs 2 times daily   Yes Historical Provider, MD   albuterol sulfate  (90 Base) MCG/ACT inhaler Inhale 2 puffs into the lungs every 4 hours as needed for Wheezing   Yes Historical Provider, MD   montelukast (SINGULAIR) 10 MG tablet Take 10 mg by mouth nightly   Yes Historical Provider, MD   gabapentin (NEURONTIN) 100 MG capsule Take 200 mg by mouth 3 times daily. .   Yes Historical Provider, MD   polyethylene glycol (GLYCOLAX) powder Take 17 g by mouth daily   Yes Historical Provider, MD   traMADol (ULTRAM) 50 MG tablet Take 50 mg by mouth

## 2018-06-23 NOTE — FLOWSHEET NOTE
06/23/18 1454   Skin Integrity   Skin Integrity Rash  (dry scaley)   Location bilateral shins   Mepilex 4x4 applied to left shin, then both legs wrapped with ace wraps from foot to knees.

## 2018-06-23 NOTE — CONSULTS
[address] [place] [address]                                   CONSULTATION    PATIENT NAME: Adelaida Villanueva                        :        1945  MED REC NO:   [id number]                           ROOM:       [id number]  ACCOUNT NO:   [de-identified]                           ADMIT DATE: 2018  PROVIDER:     Kendal Blake. JERSON Mota Gave:  2018    REASON FOR CONSULTATION:  Minimally elevated troponin in a 77-year-old  female patient admitted with COPD exacerbation after presentation with  worsening of shortness of breath and cough. HISTORY OF PRESENT ILLNESS:  This is a 77-year-old female patient known to  have history of asthma, COPD and has been treated for COPD like a few weeks  ago and was in usual state of health until a week ago. In the last couple  of weeks, the patient was noted to have worsening of shortness of breath,  cough and in the last 2 days the cough and shortness of breath got worse  and yesterday she saw primary care physician and he advised her to go to  the emergency room where she was found to have chest x-ray negative for  pneumonia, but the troponin was elevated to 0.025 and EKG was normal sinus  rhythm. No EKG changes. The patient denied any form of chest pain, but  she has cough productive of whitish sputum and so in view of that the  patient was transferred to our medical center for further evaluation and  management. She has no fever or chills. She has no nausea or vomiting. She has a history of bilateral leg cellulitis concerning for possible  peripheral artery disease. No history of coronary artery disease. REVIEW OF SYSTEMS:  Negative except for the above-mentioned ones. PAST MEDICAL HISTORY:  Hypertension, osteoporosis, rheumatoid arthritis,  asthma, COPD, chronic smoker. PAST SURGICAL HISTORY:  Joint replacement.     HOME MEDICATIONS PRIOR TO ADMISSION:  Include albuterol, no ST-segment  changes. Basically old inferior myocardial infarction noted; otherwise, no  acute EKG abnormality and unchanged from previous EKGs done in [facility]  at least.    ASSESSMENT:  1. Acute COPD exacerbation. 2.  Bilateral lower leg cellulitis, possible peripheral artery disease. 3.  Hypertension, poorly controlled. 4.  Elevated troponin, probably from the combination of the above high  blood pressure, cellulitis and COPD/asthma exacerbation. 5.  History of asthma. 6.  Obesity  7. Tobacco abuse, longstanding    PLAN/RECOMMENDATIONS:  At this level, the troponin elevation is normalized  and so there is only one troponin that was elevated here and one in the  [facility]; otherwise, subsequent troponin were negative. No acute EKG  change and the patient did not present with chest pain. She presented with  worsening of cough and shortness of breath for which she has been treated  repeatedly for bronchitis so probably looks like more of acute COPD  exacerbation. So, probably the troponin related to COPD exacerbation and  blood pressure was elevated in the 180s that could be contributing to the  troponin elevation. At this level, we will control the blood pressure treated for COPD  exacerbation, treat her for the underlying COPD exacerbation. We will do an echocardiogram, assess LV systolic function and we will do  functional study, Lexiscan nuclear stress test and based on that we will  gauge further care. The patient needs to be on aspirin and we need to  check her cholesterol and lipid panel and address accordingly. Actually  she has a lipid panel done and within acceptable range, so I do not think  she needs any statins at this level. Based on the course and the findings from the above tests, we will gauge  further care. Thank you for allowing us to participate in the care of this patient. We  will follow up with you. Andreas Marmolejo.  Mau Willson M.D.    D: 06/23/2018 9:52:49       T:

## 2018-06-23 NOTE — PLAN OF CARE
Problem: Falls - Risk of:  Goal: Will remain free from falls  Will remain free from falls   Outcome: Met This Shift  Patient has been up to the bathroom and up to the chair with standby assist, gait steady, denies dizziness. Goal: Absence of physical injury  Absence of physical injury   Outcome: Met This Shift      Problem: OXYGENATION/RESPIRATORY FUNCTION  Goal: Patient will maintain patent airway  Outcome: Met This Shift  Patient maintaining good pulse ox on room air. Goal: Patient will achieve/maintain normal respiratory rate/effort  Respiratory rate and effort will be within normal limits for the patient  Outcome: Ongoing  Patient gets very short of breath with walking to the bathroom. Patient recovers fairly quickly with rest.    Comments: Patient participating in plan of care and goal setting.

## 2018-06-23 NOTE — PROGRESS NOTES
arthralgias, back pain, joint swelling, myalgias, neck pain and neck stiffness. Skin: Negative for color change, pallor, rash and wound. Allergic/Immunologic: Negative for food allergies. Neurological: Positive for weakness. Negative for dizziness, seizures, syncope, facial asymmetry, speech difficulty, numbness and headaches. Hematological: Negative for adenopathy. Does not bruise/bleed easily. Psychiatric/Behavioral: Negative for agitation, confusion, dysphoric mood, self-injury, sleep disturbance and suicidal ideas. The patient is not nervous/anxious and is not hyperactive. Physical Exam   Constitutional: She is oriented to person, place, and time. She appears well-developed and well-nourished. No distress. HENT:   Head: Normocephalic and atraumatic. Right Ear: External ear normal.   Left Ear: External ear normal.   Nose: Nose normal.   Mouth/Throat: Oropharynx is clear and moist. No oropharyngeal exudate. Eyes: Conjunctivae and EOM are normal. Pupils are equal, round, and reactive to light. Right eye exhibits no discharge. Left eye exhibits no discharge. No scleral icterus. Neck: Normal range of motion. Neck supple. JVD present. No tracheal deviation present. No thyromegaly present. Cardiovascular: Normal rate, regular rhythm, normal heart sounds and intact distal pulses. Exam reveals no gallop and no friction rub. No murmur heard. Pulmonary/Chest: Effort normal and breath sounds normal. No stridor. No respiratory distress. She has no wheezes. She has no rales. She exhibits no tenderness. Abdominal: Soft. Bowel sounds are normal. She exhibits no distension and no mass. There is no tenderness. There is no rebound and no guarding. Musculoskeletal: Normal range of motion. She exhibits edema. She exhibits no tenderness or deformity. Lymphadenopathy:     She has no cervical adenopathy. Neurological: She is alert and oriented to person, place, and time.  She displays normal STYLE.       CHRONIC TOBACCO USE DISORDER-NICOTINE PATCH.      12.DISPO. PLANNED D/C TO HOME VS REHAB.       Electronically signed by Peter Tyler MD on 6/23/2018 at 6:04 PM    Rounding Hospitalist

## 2018-06-24 PROBLEM — E11.65 TYPE 2 DIABETES MELLITUS WITH HYPERGLYCEMIA, WITHOUT LONG-TERM CURRENT USE OF INSULIN (HCC): Status: ACTIVE | Noted: 2018-06-24

## 2018-06-24 PROBLEM — E55.9 VITAMIN D DEFICIENCY: Status: ACTIVE | Noted: 2018-06-24

## 2018-06-24 LAB
GLUCOSE BLD-MCNC: 106 MG/DL (ref 70–108)
GLUCOSE BLD-MCNC: 144 MG/DL (ref 70–108)
GLUCOSE BLD-MCNC: 189 MG/DL (ref 70–108)
GLUCOSE BLD-MCNC: 201 MG/DL (ref 70–108)
GLUCOSE BLD-MCNC: 48 MG/DL (ref 70–108)
HEMOCCULT STL QL: NEGATIVE
IRON: 92 UG/DL (ref 50–170)
OSMOLALITY: 306 MOSMOL/KG (ref 275–295)
TSH SERPL DL<=0.05 MIU/L-ACNC: 0.59 UIU/ML (ref 0.4–4.2)
VITAMIN D 25-HYDROXY: 19 NG/ML (ref 30–100)

## 2018-06-24 PROCEDURE — G8978 MOBILITY CURRENT STATUS: HCPCS

## 2018-06-24 PROCEDURE — 6370000000 HC RX 637 (ALT 250 FOR IP): Performed by: INTERNAL MEDICINE

## 2018-06-24 PROCEDURE — G8979 MOBILITY GOAL STATUS: HCPCS

## 2018-06-24 PROCEDURE — 84443 ASSAY THYROID STIM HORMONE: CPT

## 2018-06-24 PROCEDURE — 83540 ASSAY OF IRON: CPT

## 2018-06-24 PROCEDURE — 2580000003 HC RX 258: Performed by: INTERNAL MEDICINE

## 2018-06-24 PROCEDURE — 2140000000 HC CCU INTERMEDIATE R&B

## 2018-06-24 PROCEDURE — 87077 CULTURE AEROBIC IDENTIFY: CPT

## 2018-06-24 PROCEDURE — 82948 REAGENT STRIP/BLOOD GLUCOSE: CPT

## 2018-06-24 PROCEDURE — 94640 AIRWAY INHALATION TREATMENT: CPT

## 2018-06-24 PROCEDURE — 87186 SC STD MICRODIL/AGAR DIL: CPT

## 2018-06-24 PROCEDURE — 99232 SBSQ HOSP IP/OBS MODERATE 35: CPT | Performed by: PHYSICIAN ASSISTANT

## 2018-06-24 PROCEDURE — 87070 CULTURE OTHR SPECIMN AEROBIC: CPT

## 2018-06-24 PROCEDURE — G8980 MOBILITY D/C STATUS: HCPCS

## 2018-06-24 PROCEDURE — 83930 ASSAY OF BLOOD OSMOLALITY: CPT

## 2018-06-24 PROCEDURE — 99231 SBSQ HOSP IP/OBS SF/LOW 25: CPT | Performed by: INTERNAL MEDICINE

## 2018-06-24 PROCEDURE — 36415 COLL VENOUS BLD VENIPUNCTURE: CPT

## 2018-06-24 PROCEDURE — 6360000002 HC RX W HCPCS: Performed by: INTERNAL MEDICINE

## 2018-06-24 PROCEDURE — 97161 PT EVAL LOW COMPLEX 20 MIN: CPT

## 2018-06-24 PROCEDURE — 87205 SMEAR GRAM STAIN: CPT

## 2018-06-24 PROCEDURE — 99232 SBSQ HOSP IP/OBS MODERATE 35: CPT | Performed by: INTERNAL MEDICINE

## 2018-06-24 PROCEDURE — 82306 VITAMIN D 25 HYDROXY: CPT

## 2018-06-24 RX ORDER — GLIMEPIRIDE 2 MG/1
2 TABLET ORAL
Status: DISCONTINUED | OUTPATIENT
Start: 2018-06-25 | End: 2018-06-25 | Stop reason: HOSPADM

## 2018-06-24 RX ORDER — PREDNISONE 20 MG/1
40 TABLET ORAL DAILY
Status: DISCONTINUED | OUTPATIENT
Start: 2018-06-24 | End: 2018-06-25 | Stop reason: HOSPADM

## 2018-06-24 RX ADMIN — Medication 2 PUFF: at 18:32

## 2018-06-24 RX ADMIN — Medication 2 PUFF: at 09:55

## 2018-06-24 RX ADMIN — METFORMIN HYDROCHLORIDE 500 MG: 500 TABLET ORAL at 16:40

## 2018-06-24 RX ADMIN — ENOXAPARIN SODIUM 40 MG: 40 INJECTION SUBCUTANEOUS at 16:40

## 2018-06-24 RX ADMIN — DOXYCYCLINE HYCLATE 100 MG: 100 TABLET, COATED ORAL at 08:16

## 2018-06-24 RX ADMIN — Medication 1 UNITS: at 21:14

## 2018-06-24 RX ADMIN — ATORVASTATIN CALCIUM 20 MG: 20 TABLET, FILM COATED ORAL at 21:10

## 2018-06-24 RX ADMIN — Medication 4 UNITS: at 11:40

## 2018-06-24 RX ADMIN — Medication 10 ML: at 21:11

## 2018-06-24 RX ADMIN — VITAMIN D, TAB 1000IU (100/BT) 1000 UNITS: 25 TAB at 16:40

## 2018-06-24 RX ADMIN — GABAPENTIN 200 MG: 100 CAPSULE ORAL at 21:10

## 2018-06-24 RX ADMIN — CLONIDINE HYDROCHLORIDE 0.2 MG: 0.2 TABLET ORAL at 21:10

## 2018-06-24 RX ADMIN — CLONIDINE HYDROCHLORIDE 0.2 MG: 0.2 TABLET ORAL at 08:16

## 2018-06-24 RX ADMIN — Medication 10 ML: at 11:41

## 2018-06-24 RX ADMIN — DILTIAZEM HYDROCHLORIDE 240 MG: 240 CAPSULE, COATED, EXTENDED RELEASE ORAL at 08:16

## 2018-06-24 RX ADMIN — ASPIRIN 81 MG 81 MG: 81 TABLET ORAL at 08:36

## 2018-06-24 RX ADMIN — GABAPENTIN 200 MG: 100 CAPSULE ORAL at 08:16

## 2018-06-24 RX ADMIN — MIRTAZAPINE 15 MG: 15 TABLET, FILM COATED ORAL at 21:10

## 2018-06-24 RX ADMIN — CETIRIZINE HYDROCHLORIDE 5 MG: 5 TABLET, FILM COATED ORAL at 08:16

## 2018-06-24 RX ADMIN — PREDNISONE 40 MG: 20 TABLET ORAL at 16:40

## 2018-06-24 RX ADMIN — DOXYCYCLINE HYCLATE 100 MG: 100 TABLET, COATED ORAL at 21:10

## 2018-06-24 RX ADMIN — GABAPENTIN 200 MG: 100 CAPSULE ORAL at 16:40

## 2018-06-24 RX ADMIN — ALBUTEROL SULFATE 2.5 MG: 2.5 SOLUTION RESPIRATORY (INHALATION) at 21:48

## 2018-06-24 RX ADMIN — MONTELUKAST SODIUM 10 MG: 10 TABLET, FILM COATED ORAL at 21:10

## 2018-06-24 RX ADMIN — Medication 2 PUFF: at 09:58

## 2018-06-24 ASSESSMENT — ENCOUNTER SYMPTOMS
ABDOMINAL DISTENTION: 0
RECTAL PAIN: 0
RHINORRHEA: 0
CHOKING: 0
BACK PAIN: 0
SHORTNESS OF BREATH: 0
EYE ITCHING: 0
ABDOMINAL PAIN: 0
NAUSEA: 0
CHEST TIGHTNESS: 0
COLOR CHANGE: 0
STRIDOR: 0
TROUBLE SWALLOWING: 0
CONSTIPATION: 0
WHEEZING: 0
SINUS PRESSURE: 0
SORE THROAT: 0
APNEA: 0
VOICE CHANGE: 0
DIARRHEA: 0
COUGH: 1
ANAL BLEEDING: 0
EYE REDNESS: 0
VOMITING: 0

## 2018-06-24 ASSESSMENT — PAIN SCALES - GENERAL
PAINLEVEL_OUTOF10: 0

## 2018-06-24 NOTE — CONSULTS
[facility]    Patient - Joyce Contreras   MRN -  [id number]   [facility]   - 1945      Date of Admission -  2018  8:32 PM  Date of evaluation -  2018  Room - 3B-35/035-A   Hospital Day - 2  Consulting - Rosemary Arzate MD Primary Care Physician - Soto Freeman MD   Chief Complaint   kshort of breath  Active Hospital Problem List      Active Hospital Problems    Diagnosis Date Noted    [facility]) [facility] [J44.9] 2018    Tobacco use disorder [F17.200] 2018    Acute bronchitis due to other specified organisms [J20.8] 2018    Hyperglycemia, unspecified [R73.9] 2018    NSTEMI (non-ST elevated myocardial infarction) ([facility] [I21.4] 2018    Cellulitis [L03.90] 2018    PVD (peripheral vascular disease) ([facility] [I73.9] 2018    RA (rheumatoid arthritis) ([facility] [M06.9] 2018    Osteoporosis [M81.0] 2018    Essential hypertension [I10] 2018    Asthma [J45.909] 2018    Morbid obesity ([facility] [E66.01] 2018     HPI   Joyce Contreras is a 68 y.o. female admitted for   Pt is a 67 y/o with hx of Asthma, RA and osteoporosis. Pt  Went for 2 weeks f/u for bilat cellulitis and pcp thought she was SOB. Pt was sent to ER at OSH, she was found to have cough, SOB and ? Chest pain. CXR was reported as negative but trop was 0.025. EKG NSR. l. She reports ten year history of asthma, takes advair and singulair, occasional ventolin, but lately using 4-5 times per day. Notes that smoking makes symptoms worse. Pt was transferred here for evaluation of NSTEMI. Underwent stress test which was negative. Here, she denies chest pain, has cough and FISHER. Has been admitted once for breathing issues about 4 years ago, thinks she got antibiotics and steroids at that time.        Past Medical History         Diagnosis Date    Arthritis     Asthma     Hypertension nicotine  1 patch Transdermal Daily    cloNIDine  0.2 mg Oral BID    diltiazem  240 mg Oral Daily    gabapentin  200 mg Oral TID    mirtazapine  15 mg Oral Nightly    montelukast  10 mg Oral Nightly    sodium chloride flush  10 mL Intravenous 2 times per day    atorvastatin  20 mg Oral Nightly    aspirin  81 mg Oral Daily    enoxaparin  40 mg Subcutaneous Q24H    cetirizine  5 mg Oral Daily    mometasone-formoterol  2 puff Inhalation BID     glucose, dextrose, glucagon (rDNA), dextrose, albuterol, albuterol sulfate HFA, sodium chloride flush, acetaminophen, magnesium hydroxide, ondansetron, traMADol, HYDROcodone 5 mg - acetaminophen  IV Drips/Infusions   dextrose       Vitals    Vitals    height is 5' 3\" (1.6 m) and weight is 213 lb 1.6 oz (96.7 kg). Her oral temperature is 98.2 °F (36.8 °C). Her blood pressure is 128/71 and her pulse is 80. Her respiration is 17 and oxygen saturation is 90%. I/O    Intake/Output Summary (Last 24 hours) at 06/24/18 1459  Last data filed at 06/24/18 1410   Gross per 24 hour   Intake              689 ml   Output              150 ml   Net              539 ml     Patient Vitals for the past 96 hrs (Last 3 readings):   Weight   06/22/18 2026 213 lb 1.6 oz (96.7 kg)     Exam   Constitutional: Patient appears moderately built and moderately nourished. Head: Normocephalic and atraumatic. Mouth/Throat: Oropharynx is clear and moist.  No oral thrush. Eyes: Conjunctivae are normal. Pupils are equal, round, and reactive to light. No scleral icterus. Neck: Neck supple. No JVD or tracheal deviation present. Cardiovascular: Regular rate, regular rhythm, S1 and S2 with no murmur. No peripheral edema  Pulmonary/Chest: Normal effort with scattered wheezes. Abdominal: Soft. Bowel sounds audible. No distension or tenderness to palp  Musculoskeletal: Moves all extremities  Lymphadenopathy:  No cervical adenopathy.    Neurological: Patient is alert and oriented to person, history of RA, asthma, obesity, and nicotine dependence presenting with SOB and found to have non specific trop elevation with subsequent negative stress test.     More likely COPD than late onset asthma, no PFT on file for confirmation though. She does have wheezing, sob, and I think treating her as COPD is reasonable. CXR is abnormal, but procal is negative. So standard COPD ABX is OK from my perspective, azithro or doxycycline. Counseled on importance of smoking cessation, to include risks of continuing, benefits of stopping, and adjuncts in smoking cessation such as quit line, support group, and pharmacologic agents. Recommendations     -add 5 day prednisone burst  -ABX per primary, I'm OK with doxy or azithro.  -continue nebulized breathing treatments  -resume advair upon DC, if patient has COPD may benefit from addition of LAMA, but at this point would await definitive testing.   -administer prevnar 13 immunization  -outpatient referral to pulmonary for PFT in 6-8 weeks. Thank you for the consult and allowing us to participate in the care of your patient. Case discussed with nurse and patient/family. Questions and concerns addressed. Meds and Orders reviewed.     Electronically signed by Renan Sarmiento DO on 6/24/2018 at 2:59 PM

## 2018-06-24 NOTE — PROGRESS NOTES
Hospitalist Progress Note  [facility] 3B       Patient: Lesly Prince  Unit/Bed: 3B-35/035-A  YOB: 1945  MRN: [id number]  Acct: [de-identified]   Admitting Diagnosis: NSTEMI (non-ST elevated myocardial infarction) [facility]) [I21.4]  Admit Date:  6/22/2018  Hospital Day: 2    Subjective:    Patient is feeling fine and has no new complaints. Negative stress test for ischemia. Hyperglycemia w/ A1C of 7.1. Has no h/o DM. No h/o CAD or CHF and does not follow up w/ any cardiologist.    Recent onset of sob and b/l LE swelling. Patient Seen, Chart, Labs, Radiology studies, and Consults reviewed. Objective:   /71   Pulse 80   Temp 98.2 °F (36.8 °C) (Oral)   Resp 17   Ht 5' 3\" (1.6 m)   Wt 213 lb 1.6 oz (96.7 kg)   SpO2 90%   BMI 37.75 kg/m²       Intake/Output Summary (Last 24 hours) at 06/24/18 1154  Last data filed at 06/24/18 0549   Gross per 24 hour   Intake           431.84 ml   Output              150 ml   Net           281.84 ml     Review of Systems   Constitutional: Negative for activity change, chills, fatigue, fever and unexpected weight change. HENT: Negative for congestion, drooling, ear pain, hearing loss, nosebleeds, rhinorrhea, sinus pressure, sore throat, tinnitus, trouble swallowing and voice change. Eyes: Negative for redness, itching and visual disturbance. Respiratory: Positive for cough. Negative for apnea, choking, chest tightness, shortness of breath, wheezing and stridor. Cardiovascular: Positive for leg swelling. Negative for chest pain and palpitations. Gastrointestinal: Negative for abdominal distention, abdominal pain, anal bleeding, constipation, diarrhea, nausea, rectal pain and vomiting. Endocrine: Negative for heat intolerance, polyphagia and polyuria.    Genitourinary: Negative for decreased urine volume, difficulty urinating, dysuria, flank pain, genital sores, hematuria, pelvic pain, vaginal bleeding, vaginal discharge and vaginal She displays normal reflexes. No cranial nerve deficit. She exhibits normal muscle tone. Coordination normal.   Skin: Skin is warm and dry. No rash noted. She is not diaphoretic. There is erythema. No pallor. Mild b/l tibia erythema. Psychiatric: She has a normal mood and affect. Her behavior is normal. Judgment and thought content normal.   Nursing note and vitals reviewed. Medications:    doxycycline hyclate  100 mg Oral 2 times per day    cefTRIAXone (ROCEPHIN) IV  1 g Intravenous Q24H    insulin lispro  0-12 Units Subcutaneous TID WC    insulin lispro  0-6 Units Subcutaneous Nightly    nicotine  1 patch Transdermal Daily    cloNIDine  0.2 mg Oral BID    diltiazem  240 mg Oral Daily    gabapentin  200 mg Oral TID    mirtazapine  15 mg Oral Nightly    montelukast  10 mg Oral Nightly    sodium chloride flush  10 mL Intravenous 2 times per day    atorvastatin  20 mg Oral Nightly    aspirin  81 mg Oral Daily    enoxaparin  40 mg Subcutaneous Q24H    cetirizine  5 mg Oral Daily    mometasone-formoterol  2 puff Inhalation BID     Continuous Infusions:   dextrose       PRN Meds:glucose, dextrose, glucagon (rDNA), dextrose, albuterol, albuterol sulfate HFA, sodium chloride flush, acetaminophen, magnesium hydroxide, ondansetron, traMADol, HYDROcodone 5 mg - acetaminophen    Data:  CBC:   Recent Labs      06/23/18   0041  06/23/18   0501   WBC  8.6  10.1   RBC  5.50*  5.54*   HGB  12.8  12.8   HCT  40.1  40.3   MCV  73.0*  72.8*   RDW  16.5*  16.0*   PLT  231  226     BMP:   Recent Labs      06/23/18   0041  06/23/18   0501   NA  138  140   K  4.2  4.7   CL  96*  100   CO2  25  27   BUN  13  14   CREATININE  0.7  0.8     BNP: No results for input(s): BNP in the last 72 hours. PT/INR: No results for input(s): PROTIME, INR in the last 72 hours. APTT: No results for input(s): APTT in the last 72 hours.   CARDIAC ENZYMES:   Recent Labs      06/22/18   2117  06/23/18   0041  06/23/18   0501   TROPONINT 0.020*  < 0.010  < 0.010     FASTING LIPID PANEL:  Lab Results   Component Value Date    CHOL 177 06/23/2018    HDL 53 06/23/2018    TRIG 50 06/23/2018     Results for Salazar Ku (MRN [id number]) as of 6/24/2018 11:52   Ref. Range 6/24/2018 07:43   Osmolality Latest Ref Range: 275 - 295 mosmol/kg 306 (H)   TSH Latest Ref Range: 0.400 - 4.20 uIU/mL 0.595   Vit D, 25-Hydroxy Latest Ref Range: 30 - 100 ng/ml 19 (L)   Iron Latest Ref Range: 50 - 170 ug/dL 92       LIVER PROFILE: No results for input(s): AST, ALT, ALB, BILIDIR, BILITOT, ALKPHOS in the last 72 hours. ABGs:   Lab Results   Component Value Date    PH 7.42 06/23/2018    PCO2 46 06/23/2018    PO2 61 06/23/2018    HCO3 30 06/23/2018    O2SAT 91 06/23/2018     MICROBIOLOGY & HISTOPATHOLOGY. Results for Salazar Ku (MRN [id number]) as of 6/24/2018 11:52   Ref. Range 6/23/2018 22:05   MRSA SCREEN RT-PCR Unknown NEGATIVE     Results for Salazar Ku (MRN [id number]) as of 6/24/2018 16:07   Ref. Range 6/23/2018 22:05   MRSA SCREEN RT-PCR Unknown NEGATIVE     Results for Salazar Ku (MRN [id number]) as of 6/25/2018 10:25   Ref. Range 6/23/2018 15:26   OCCULT BLOOD FECAL Unknown Negative       TOXICOLOGY. None. ENDOSCOPE STUDIES. None. PROCEDURES. None. RADIOLOGY. LEXISCAN-6/23/2018. Summary   Lexiscan EKG stress test is not suggestive for ischemia.   This Nuclear Medicine study was negative for ischemia.     CXR-6/23/2018. 1. Poor inflation lungs. Normal heart size. 2. Small eventration right hemidiaphragm. There are a few old healed rib fractures left side. 3. Mild left basilar atelectasis/pneumonia. Questionable mild additional/pneumonia right lung base and right apex. MRI T-SPINE-4/27/2018. 1. There has been further loss of height of the T6 vertebral body. The loss of height measures approximately 64%.        Subtle hyperintense T2 signal is also noted within the T6 vertebral body and this       likely corresponds to

## 2018-06-24 NOTE — PROGRESS NOTES
[address] [address]  INPATIENT PHYSICAL THERAPY  EVALUATION  [facility]    Time In: 1310  Time Out: 1320  Timed Code Treatment Minutes: 0 Minutes  Minutes: 10          Date: 2018  Patient Name: Shelly Mendoza,  Gender:  female        MRN: [id number]  : 1945  (68 y.o.)      Referring Practitioner: Dr Bebeto Emerson  Diagnosis: NSTEMI  Additional Pertinent Hx: Per ER note 18: 69 y/o with hx of Asthma, RA and osteoporosis. Pt  Went for 2 weeks f/u for bilat cellulitis and pcp thought she was SOB. Pt was sent to ER at OSH, she was found to have cough, SOB and ? Chest pain. CXR was reported as negative but trop was 0.025. Had a stress test am on 18. Past Medical History:   Diagnosis Date    Arthritis     Asthma     Hypertension     Osteoporosis     Rheumatoid arthritis ([address]      Past Surgical History:   Procedure Laterality Date    JOINT REPLACEMENT Bilateral        Restrictions/Precautions:  Fall Risk                            Subjective:  Chart Reviewed: Yes  Patient assessed for rehabilitation services?: Yes  Family / Caregiver Present: No  Subjective: Pt inbed, agreed to PT and ambulation in locke. General:  Overall Orientation Status: Within Normal Limits  Follows Commands: Within Functional Limits    Vision: Within Functional Limits    Hearing: Within functional limits         Pain:  Denies. Social/Functional History:    Lives With: Son  Type of Home: House  Home Layout: One level  Home Access: Stairs to enter without rails  Entrance Stairs - Number of Steps: 2-3     Bathroom Shower/Tub: Walk-in shower  Bathroom Toilet: Handicap height  Bathroom Equipment: Grab bars in shower       ADL Assistance: [address] Responsibilities: Yes  Ambulation Assistance: Independent  Transfer Assistance: Independent    Active : Yes     Additional Comments: Pt was fully indep PLOF without AD. Objective:       RLE AROM: WFL         LLE AROM : WFL       Strength RLE: WFL    Strength LLE: WFL       Sensation  Overall Sensation Status: WNL       Balance  Sitting - Static: Good  Sitting - Dynamic: Good  Standing - Static: Good  Standing - Dynamic: Good    Supine to Sit: Modified independent  Sit to Supine: Modified independent  Scooting: Modified independent    Transfers  Sit to Stand: Modified independent  Stand to sit: Modified independent       Ambulation 1  Surface: level tile  Device: No Device  Assistance: Supervision  Quality of Gait: steady, fair pace. pt had standing rest break before walking back to room. Distance: 100 feet X 2                     Activity Tolerance:  Activity Tolerance: Patient Tolerated treatment well    Treatment Initiated:none  Assessment: Body structures, Functions, Activity limitations: Decreased endurance  Assessment: Pt with limited endurance but steady and safe with gait and all mobility. No need for therapy. Can walk in halls on her own  Prognosis: Excellent    Clinical Presentation:  Pt with limited endurance but steady and safe with gait and all mobility. No need for therapy. Can walk in halls on her own  Decision Making: Low Complexitybased on patient assessment and decision making process of determining plan of care and establishing reasonable expectations for measurable functional outcomes    REQUIRES PT FOLLOW UP: No  No Skilled PT: Independent with functional mobility   Discharge Recommendations: Home with assist PRN    Patient Education:  Patient Education: POC    Equipment Recommendations:  Equipment Needed: No    Safety:  Type of devices: Left in chair, Call light within reach, Nurse notified  Restraints  Initially in place: No    Plan:  Times per week: NA    Goals:     Short term goals  Time Frame for Short term goals: NA- pt at baseline  Long term goals  Time Frame for Long term goals : NA    Evaluation Complexity: Based on the findings of patient history, examination, clinical presentation, and decision making during this evaluation, the evaluation of Americo Gardner  is of low complexity.     PT G-Codes  Functional Limitation: Mobility: Walking and moving around  Mobility: Walking and Moving Around Current Status (): 0 percent impaired, limited or restricted  Mobility: Walking and Moving Around Goal Status (): 0 percent impaired, limited or restricted  Mobility: Walking and Moving Around Discharge Status (): 0 percent impaired, limited or restricted       AM-PAC Inpatient Mobility without Stair Climbing Raw Score : 20  AM-PAC Inpatient without Stair Climbing T-Scale Score : 60.57  Mobility Inpatient CMS 0-100% Score: 0  Mobility Inpatient without Stair CMS G-Code Modifier : [address]

## 2018-06-24 NOTE — PLAN OF CARE
Problem: RESPIRATORY  Goal: Lung sounds clear or within normal limits for patient  Outcome: Ongoing  Treatment will be continued as ordered. Patient on home regime.

## 2018-06-24 NOTE — PROGRESS NOTES
erythema  Head: normocephalic and atraumatic  Eyes: pupils equal, round, and reactive to light  Neck: supple and non-tender without mass, no thyromegaly   Musculoskeletal: normal range of motion, no joint swelling, deformity or tenderness  Neurological: alert, oriented, normal speech, no focal findings or movement disorder noted    Medications:    doxycycline hyclate  100 mg Oral 2 times per day    cefTRIAXone (ROCEPHIN) IV  1 g Intravenous Q24H    insulin lispro  0-12 Units Subcutaneous TID WC    insulin lispro  0-6 Units Subcutaneous Nightly    nicotine  1 patch Transdermal Daily    cloNIDine  0.2 mg Oral BID    diltiazem  240 mg Oral Daily    gabapentin  200 mg Oral TID    mirtazapine  15 mg Oral Nightly    montelukast  10 mg Oral Nightly    sodium chloride flush  10 mL Intravenous 2 times per day    atorvastatin  20 mg Oral Nightly    aspirin  81 mg Oral Daily    enoxaparin  40 mg Subcutaneous Q24H    cetirizine  5 mg Oral Daily    mometasone-formoterol  2 puff Inhalation BID      dextrose         glucose 15 g PRN   dextrose 12.5 g PRN   glucagon (rDNA) 1 mg PRN   dextrose 100 mL/hr PRN   albuterol 2.5 mg Q6H PRN   albuterol sulfate HFA 2 puff Q4H PRN   sodium chloride flush 10 mL PRN   acetaminophen 650 mg Q4H PRN   magnesium hydroxide 30 mL Daily PRN   ondansetron 4 mg Q6H PRN   traMADol 50 mg Q6H PRN   HYDROcodone 5 mg - acetaminophen 1 tablet Q4H PRN       Diagnostics:  EKG:  NSR    Echo: 6/23/2018  Conclusions      Summary   Normal left ventricle size and systolic function. Ejection fraction was   estimated at 60 %. There were no regional left ventricular wall motion   abnormalities and wall thickness was within normal limits.   Doppler parameters were consistent with abnormal left ventricular   relaxation (grade 1 diastolic dysfunction).   The left atrium is Mildly dilated.     Stress test: 6/23/2018  Conclusions      Summary   Lexiscan EKG stress test is not suggestive for ischemia.   This Nuclear Medicine study was negative for ischemia. Lab Data:    Cardiac Enzymes:  No results for input(s): CKTOTAL, CKMB, CKMBINDEX, TROPONINI in the last 72 hours.     CBC:   Lab Results   Component Value Date    WBC 10.1 06/23/2018    RBC 5.54 06/23/2018    HGB 12.8 06/23/2018    HCT 40.3 06/23/2018     06/23/2018       CMP:  Lab Results   Component Value Date     06/23/2018    K 4.7 06/23/2018     06/23/2018    CO2 27 06/23/2018    BUN 14 06/23/2018    CREATININE 0.8 06/23/2018    LABGLOM 85 06/23/2018    GLUCOSE 186 06/23/2018    CALCIUM 9.9 06/23/2018       Hepatic Function Panel:  No results found for: ALKPHOS, ALT, AST, PROT, BILITOT, BILIDIR, IBILI, LABALBU    Magnesium:  No results found for: MG    PT/INR:  No results found for: PROTIME, INR    HgBA1c:    Lab Results   Component Value Date    LABA1C 7.1 06/23/2018       FLP:  Lab Results   Component Value Date    TRIG 50 06/23/2018    HDL 53 06/23/2018    LDLCALC 114 06/23/2018       TSH:    Lab Results   Component Value Date    TSH 0.595 06/24/2018         Assessment:  · COPD exacerbation  · Mildly elevated troponin  · HTN  · Tobacco abuse  ·       Plan:  · Discussed smoking cessation in this symptomatic patient for 5 minutes  · Continue current medications  · Will see as needed  · F/U with Dr Luh Schofield 2-6 weeks after discharge         Electronically signed by Arelis Ott PA-C on 6/24/2018 at 8:31 AM

## 2018-06-24 NOTE — PLAN OF CARE
Problem: RESPIRATORY  Goal: Lung sounds clear or within normal limits for patient  Outcome: Ongoing  Home regimen

## 2018-06-24 NOTE — PLAN OF CARE
Problem: OXYGENATION/RESPIRATORY FUNCTION  Goal: Patient will maintain patent airway  Outcome: Met This Shift  The pt. Is using 2 liters nasal cannula. O2 sats are WNL. She has been active throughout the day ambulating by self to the bathroom and with standby in the hallway  Goal: Patient will achieve/maintain normal respiratory rate/effort  Respiratory rate and effort will be within normal limits for the patient   Outcome: Ongoing  Some SOB. Pt. States improved from yesterday    Problem: Risk for Impaired Skin Integrity  Goal: Tissue integrity - skin and mucous membranes  Structural intactness and normal physiological function of skin and  mucous membranes. Outcome: Met This Shift  No open areas noted on skin.

## 2018-06-25 VITALS
RESPIRATION RATE: 18 BRPM | DIASTOLIC BLOOD PRESSURE: 81 MMHG | HEART RATE: 84 BPM | SYSTOLIC BLOOD PRESSURE: 176 MMHG | WEIGHT: 213.1 LBS | HEIGHT: 63 IN | TEMPERATURE: 97.7 F | OXYGEN SATURATION: 95 % | BODY MASS INDEX: 37.76 KG/M2

## 2018-06-25 LAB
GLUCOSE BLD-MCNC: 114 MG/DL (ref 70–108)
GLUCOSE BLD-MCNC: 174 MG/DL (ref 70–108)
ORGANISM: ABNORMAL
URINE CULTURE, ROUTINE: ABNORMAL

## 2018-06-25 PROCEDURE — 99239 HOSP IP/OBS DSCHRG MGMT >30: CPT | Performed by: INTERNAL MEDICINE

## 2018-06-25 PROCEDURE — 6370000000 HC RX 637 (ALT 250 FOR IP): Performed by: INTERNAL MEDICINE

## 2018-06-25 PROCEDURE — 82948 REAGENT STRIP/BLOOD GLUCOSE: CPT

## 2018-06-25 PROCEDURE — 94640 AIRWAY INHALATION TREATMENT: CPT

## 2018-06-25 PROCEDURE — 94010 BREATHING CAPACITY TEST: CPT

## 2018-06-25 PROCEDURE — 99232 SBSQ HOSP IP/OBS MODERATE 35: CPT | Performed by: INTERNAL MEDICINE

## 2018-06-25 PROCEDURE — 97110 THERAPEUTIC EXERCISES: CPT

## 2018-06-25 PROCEDURE — APPSS30 APP SPLIT SHARED TIME 16-30 MINUTES: Performed by: NURSE PRACTITIONER

## 2018-06-25 RX ORDER — LISINOPRIL 10 MG/1
10 TABLET ORAL DAILY
Qty: 30 TABLET | Refills: 3 | Status: SHIPPED | OUTPATIENT
Start: 2018-06-25 | End: 2018-07-11

## 2018-06-25 RX ORDER — DOXYCYCLINE HYCLATE 100 MG
100 TABLET ORAL EVERY 12 HOURS SCHEDULED
Qty: 10 TABLET | Refills: 0 | Status: SHIPPED | OUTPATIENT
Start: 2018-06-25 | End: 2018-06-30

## 2018-06-25 RX ORDER — PREDNISONE 20 MG/1
20 TABLET ORAL DAILY
Qty: 5 TABLET | Refills: 0 | Status: SHIPPED | OUTPATIENT
Start: 2018-06-26 | End: 2018-07-01

## 2018-06-25 RX ORDER — ASPIRIN 81 MG/1
81 TABLET ORAL DAILY
Qty: 30 TABLET | Refills: 3 | Status: SHIPPED | OUTPATIENT
Start: 2018-06-25

## 2018-06-25 RX ORDER — GLIMEPIRIDE 2 MG/1
2 TABLET ORAL
Qty: 30 TABLET | Refills: 3 | Status: SHIPPED | OUTPATIENT
Start: 2018-06-26

## 2018-06-25 RX ORDER — ASPIRIN 81 MG/1
81 TABLET, CHEWABLE ORAL DAILY
Qty: 30 TABLET | Refills: 3 | Status: SHIPPED | OUTPATIENT
Start: 2018-06-26 | End: 2018-06-25 | Stop reason: HOSPADM

## 2018-06-25 RX ADMIN — GABAPENTIN 200 MG: 100 CAPSULE ORAL at 15:29

## 2018-06-25 RX ADMIN — CLONIDINE HYDROCHLORIDE 0.2 MG: 0.2 TABLET ORAL at 08:35

## 2018-06-25 RX ADMIN — DOXYCYCLINE HYCLATE 100 MG: 100 TABLET, COATED ORAL at 08:35

## 2018-06-25 RX ADMIN — Medication 2 PUFF: at 10:02

## 2018-06-25 RX ADMIN — DILTIAZEM HYDROCHLORIDE 240 MG: 240 CAPSULE, COATED, EXTENDED RELEASE ORAL at 08:35

## 2018-06-25 RX ADMIN — GLIMEPIRIDE 2 MG: 2 TABLET ORAL at 08:35

## 2018-06-25 RX ADMIN — PREDNISONE 40 MG: 20 TABLET ORAL at 08:35

## 2018-06-25 RX ADMIN — CETIRIZINE HYDROCHLORIDE 5 MG: 5 TABLET, FILM COATED ORAL at 08:36

## 2018-06-25 RX ADMIN — ASPIRIN 81 MG 81 MG: 81 TABLET ORAL at 08:40

## 2018-06-25 RX ADMIN — METFORMIN HYDROCHLORIDE 500 MG: 500 TABLET ORAL at 15:30

## 2018-06-25 RX ADMIN — Medication 2 UNITS: at 08:45

## 2018-06-25 RX ADMIN — VITAMIN D, TAB 1000IU (100/BT) 1000 UNITS: 25 TAB at 08:35

## 2018-06-25 RX ADMIN — GABAPENTIN 200 MG: 100 CAPSULE ORAL at 08:35

## 2018-06-25 RX ADMIN — METFORMIN HYDROCHLORIDE 500 MG: 500 TABLET ORAL at 08:35

## 2018-06-25 ASSESSMENT — PAIN SCALES - GENERAL
PAINLEVEL_OUTOF10: 0
PAINLEVEL_OUTOF10: 5
PAINLEVEL_OUTOF10: 0

## 2018-06-25 NOTE — PROGRESS NOTES
antibiotics and steroids at that time. Past 24 hours/ ROS   Still some SOB but improving  On room air  Mild NP cough  Afebrile  Bedside spirometry ordered by Dr Patt Gonzalez  Sleep History    No history of JUSTICE  Meds    Current Medications    metFORMIN  500 mg Oral BID WC    glimepiride  2 mg Oral Daily with breakfast    predniSONE  40 mg Oral Daily    vitamin D  1,000 Units Oral Daily    doxycycline hyclate  100 mg Oral 2 times per day    insulin lispro  0-12 Units Subcutaneous TID WC    insulin lispro  0-6 Units Subcutaneous Nightly    nicotine  1 patch Transdermal Daily    cloNIDine  0.2 mg Oral BID    diltiazem  240 mg Oral Daily    gabapentin  200 mg Oral TID    mirtazapine  15 mg Oral Nightly    montelukast  10 mg Oral Nightly    sodium chloride flush  10 mL Intravenous 2 times per day    atorvastatin  20 mg Oral Nightly    aspirin  81 mg Oral Daily    enoxaparin  40 mg Subcutaneous Q24H    cetirizine  5 mg Oral Daily    mometasone-formoterol  2 puff Inhalation BID     glucose, dextrose, glucagon (rDNA), dextrose, albuterol, albuterol sulfate HFA, sodium chloride flush, acetaminophen, magnesium hydroxide, ondansetron, traMADol, HYDROcodone 5 mg - acetaminophen  IV Drips/Infusions   dextrose       Vitals    Vitals    height is 5' 3\" (1.6 m) and weight is 213 lb 1.6 oz (96.7 kg). Her oral temperature is 97.7 °F (36.5 °C). Her blood pressure is 139/79 and her pulse is 90. Her respiration is 16 and oxygen saturation is 93%. I/O    Intake/Output Summary (Last 24 hours) at 06/25/18 1038  Last data filed at 06/25/18 0426   Gross per 24 hour   Intake              810 ml   Output              300 ml   Net              510 ml     Patient Vitals for the past 96 hrs (Last 3 readings):   Weight   06/22/18 2026 213 lb 1.6 oz (96.7 kg)     Exam   Physical Exam   Constitutional: No distress on room air. Patient appears moderately built and  moderately nourished.    Head: Normocephalic and Recent Labs      06/23/18   2140   SPECGRAV  1.013   PHUR  6.0   COLORU  YELLOW   PROTEINU  30*   BLOODU  NEGATIVE   RBCUA  0-2   WBCUA  NONE SEEN   BACTERIA  NONE   NITRU  NEGATIVE   BILIRUBINUR  NEGATIVE   UROBILINOGEN  0.2   KETUA  NEGATIVE   LABCAST  NONE SEEN  NONE SEEN   . PFTs   None on file    Bedside spirometry pending   Echo    6/23/2018  Summary   Normal left ventricle size and systolic function. Ejection fraction was   estimated at 60 %. There were no regional left ventricular wall motion   abnormalities and wall thickness was within normal limits.   Doppler parameters were consistent with abnormal left ventricular   relaxation (grade 1 diastolic dysfunction).   The left atrium is Mildly dilated  Cultures    Procalcitonin  Lab Results   Component Value Date    PROCAL 0.05 06/23/2018     resp gram stain- rare GPC singly and in pairs, few budding yeast, few gram pos bacilli, many gram neg bacilli  Urine culture- neg   Radiology    CXR 6/23/18  1. Poor inflation lungs. Normal heart size. 2. Small eventration right hemidiaphragm. There are a few old healed rib fractures left side. 3. Mild left basilar atelectasis/pneumonia. Questionable mild additional/pneumonia right lung base and right apex. CT Scans no new imaging. (See actual reports for details)    ASSESSMENT  67 yo female with history of RA, asthma, obesity, and nicotine dependence presenting with SOB and found to have non specific trop elevation with subsequent negative stress test.     More likely COPD than late onset asthma, no PFT on file for confirmation though. She does have wheezing, sob, treating her as COPD exacerbation  is reasonable. CXR is abnormal, but procal is negative.   So standard COPD ABX is OK from pulmonary  Perspective,( azithro or doxycycline. )    Recommendations   -obtain bedside spirometry (order placed by Dr Shantelle Neri MD)  - 5 day prednisone burst 40 mg PO daily   -ABX per primary, agree with current

## 2018-06-25 NOTE — CONSULTS
TROPONINI in the last 72 hours. Hepatic: No results for input(s): AST, ALT, ALB, BILITOT, ALKPHOS in the last 72 hours. IMAGING  Xr Chest Portable    Result Date: 6/23/2018  PROCEDURE: XR CHEST PORTABLE CLINICAL INFORMATION: Dyspnea. Cough. Chest pains. COMPARISON: No prior study. TECHNIQUE: A single mobile view of the chest was obtained. 1. Poor inflation lungs. Normal heart size. 2. Small eventration right hemidiaphragm. There are a few old healed rib fractures left side. 3. Mild left basilar atelectasis/pneumonia. Questionable mild additional/pneumonia right lung base and right apex. **This report has been created using voice recognition software. It may contain minor errors which are inherent in voice recognition technology. ** Final report electronically signed by Dr. Zackery Sy on 6/23/2018 2:49 PM      Impression and plan : Rectal bleeding , colonoscopy to be scheduled on out patient status . Patient Active Problem List   Diagnosis    NSTEMI (non-ST elevated myocardial infarction) ([address]    Cellulitis    PVD (peripheral vascular disease) ([address]    RA (rheumatoid arthritis) ([address]    Osteoporosis    Essential hypertension    Asthma    Morbid obesity ([address]    COPD (chronic obstructive pulmonary disease) ([address]    Tobacco use disorder    Acute bronchitis due to other specified organisms    Hyperglycemia, unspecified    COPD with acute exacerbation ([address]    Type 2 diabetes mellitus with hyperglycemia, without long-term current use of insulin (HCC)    Vitamin D deficiency           PLANNED PROCEDURE   []EGD  [x]Colonoscopy []Flex Sigmoid      Thanks for consultation .     Marilyn Morris MD, MS  Electronically signed 6/25/2018 at 5:24 PM

## 2018-06-25 NOTE — CARE COORDINATION
Members  Current Services PTA:     Potential Assistance Needed:  N/A  Potential Assistance Purchasing Medications:  No  Does patient want to participate in local refill/ meds to beds program?  Yes  Type of Home Care Services:  None  Patient expects to be discharged to:  home with son  Expected Discharge date:  06/25/18  Follow Up Appointment: Best Day/ Time: Wednesday PM    Discharge Plan: Pt from home with her son. She denies discharge needs.  Evaluation: no   6/25/18, 3:34 PM    Discharge plan discussed by  and . Discharge plan reviewed with patient/ family. Patient/ family verbalize understanding of discharge plan and are in agreement with plan. Understanding was demonstrated using the teach back method.

## 2018-06-25 NOTE — DISCHARGE SUMMARY
daily.. glimepiride (AMARYL) 2 MG tablet  Take 1 tablet by mouth daily (with breakfast)             lisinopril (PRINIVIL;ZESTRIL) 10 MG tablet  Take 1 tablet by mouth daily             metFORMIN (GLUCOPHAGE) 500 MG tablet  Take 1 tablet by mouth 2 times daily (with meals)             mirtazapine (REMERON) 15 MG tablet  Take 15 mg by mouth nightly             montelukast (SINGULAIR) 10 MG tablet  Take 10 mg by mouth nightly             Naproxen Sodium (ALEVE PO)  Take 2 tablets by mouth 2 times daily as needed              polyethylene glycol (GLYCOLAX) powder  Take 17 g by mouth daily             predniSONE (DELTASONE) 20 MG tablet  Take 1 tablet by mouth daily for 5 days             traMADol (ULTRAM) 50 MG tablet  Take 50 mg by mouth every 6 hours as needed for Pain. .             vitamin D (CHOLECALCIFEROL) 1000 UNIT TABS tablet  Take 1 tablet by mouth daily                 Time Spent on discharge is more than 30 minutes in the examination, evaluation, counseling and review of medications and discharge plan. Signed: Thank you Claudio Chaves MD for the opportunity to be involved in this patient's care.     Electronically signed by Zahraa Cohen MD on 6/25/2018 at 3:08 PM

## 2018-06-25 NOTE — PROGRESS NOTES
Lovely Castillo 60  INPATIENT OCCUPATIONAL THERAPY  [facility]-STEPDOWN 3B  DAILY NOTE    Time:  Time In: 916  Time Out: 941  Timed Code Treatment Minutes: 25 Minutes  Minutes: 25          Date: 2018  Patient Name: Americo Gardner,   Gender: female      Room: -35/035-A  MRN: [id number]  : 1945  (68 y.o.)  Referring Practitioner: Dr. Estelita Loya  Diagnosis: NSTEMI  Additional Pertinent Hx: Per ER note 18: 69 y/o with hx of Asthma, RA and osteoporosis. Pt  Went for 2 weeks f/u for bilat cellulitis and pcp thought she was SOB. Pt was sent to ER at OSH, she was found to have cough, SOB and ? Chest pain. CXR was reported as negative but trop was 0.025. Had a stress test am on 18. Past Medical History:   Diagnosis Date    Arthritis     Asthma     Hypertension     Osteoporosis     Rheumatoid arthritis ([facility] [address]      Past Surgical History:   Procedure Laterality Date    JOINT REPLACEMENT Bilateral        Restrictions/Precautions:  Fall Risk, General Precautions    Prior Level of Function:  ADL Assistance: Independent  Homemaking Assistance: Independent  Ambulation Assistance: Independent  Transfer Assistance: Independent  Additional Comments: Pt was fully indep PLOF without AD. Subjective       Subjective: Pt seated in chair upon arrival, agreeable to OT session. Pt declined wanting to complete ADLs, stating \"I can do that on my own\" and declined wanting to walk at this time. Pt agreeable to UB exercises only. Pt reported will be going home later today. Comments: RN ok'd session this date. Overall Orientation Status: Within Normal Limits         Pain:  Pain Assessment  Patient Currently in Pain: Denies       Objective  Overall Cognitive Status: WNL    Perception  Overall Perceptual Status: WFL    ADL  Additional Comments: Pt declined all ADLs.       Balance  Sitting Balance: Independent (sitting unsupported in chair)     Type of ROM/Therapeutic Exercise: AROM  Comment: Pt completed BUE exercises with green theraband while seated in chair. Pt completed 1 set X10 reps in all planes to increase endurance/strength needed for ADL/IADL tasks and transfers. Pt tolerated well, short rest break in between variations and occasional cuing for pursed lip breathing. Recommendations given to continue completing at home, 1-2X/day progressing repetitions and resistance as able to tolerate. Options given of use of additional therabands from home or use of weights to continue progressing exercises. Pt verbalized understanding, reporting would continue completing upon returning home. Activity Tolerance:  Activity Tolerance: Patient Tolerated treatment well  Activity Tolerance: In addition to exercises, education provided re: ECT for modifying ADL/IADL tasks at home. Recommendations given to take breaks prn, completing ADL/IADL when feeling the most rested, and only completing 1-2 IADL tasks throughout day at a time or completing all aspects in 1 room only during the day. Pt verbalized understanding of all, declined any further questions/concerns or DME needs. Assessment:     Performance deficits / Impairments: Decreased functional mobility , Decreased endurance, Decreased ADL status  Prognosis: Fair  Discharge Recommendations: Continue to assess pending progress    Patient Education:  Patient Education: BUE HEP, ECT, modified ADL/IADL  Barriers to Learning: none    Equipment Recommendations:  Equipment Needed: Yes  Other: Monitor for shower for shower chair need (Pt reported she thinks son has one). Safety:  Safety Devices in place: Yes  Type of devices:  All fall risk precautions in place, Call light within reach, Left in chair, Nurse notified, Chair alarm in place    Plan:  Times per week: 5x  Current Treatment Recommendations: Endurance Training, Self-Care / ADL, Functional Mobility Training    Goals:  Patient goals : go home & not be so short of breath    Short term goals  Time Frame for

## 2018-06-27 ENCOUNTER — OFFICE VISIT (OUTPATIENT)
Dept: INTERNAL MEDICINE CLINIC | Age: 73
End: 2018-06-27
Payer: MEDICARE

## 2018-06-27 VITALS — BODY MASS INDEX: 35.85 KG/M2 | WEIGHT: 202.38 LBS

## 2018-06-27 DIAGNOSIS — E11.65 TYPE 2 DIABETES MELLITUS WITH HYPERGLYCEMIA, WITHOUT LONG-TERM CURRENT USE OF INSULIN (HCC): ICD-10-CM

## 2018-06-27 LAB
GRAM STAIN RESULT: ABNORMAL
ORGANISM: ABNORMAL
RESPIRATORY CULTURE: ABNORMAL
RESPIRATORY CULTURE: ABNORMAL

## 2018-06-27 PROCEDURE — 99999 PR OFFICE/OUTPT VISIT,PROCEDURE ONLY: CPT | Performed by: NURSE PRACTITIONER

## 2018-06-27 PROCEDURE — G0108 DIAB MANAGE TRN  PER INDIV: HCPCS | Performed by: NURSE PRACTITIONER

## 2018-07-11 ENCOUNTER — HOSPITAL ENCOUNTER (OUTPATIENT)
Dept: WOUND CARE | Age: 73
Discharge: HOME | End: 2018-07-11
Payer: MEDICARE

## 2018-07-11 VITALS
SYSTOLIC BLOOD PRESSURE: 188 MMHG | HEART RATE: 100 BPM | RESPIRATION RATE: 18 BRPM | TEMPERATURE: 98.6 F | OXYGEN SATURATION: 92 % | DIASTOLIC BLOOD PRESSURE: 76 MMHG | WEIGHT: 208.7 LBS | BODY MASS INDEX: 36.98 KG/M2 | HEIGHT: 63 IN

## 2018-07-11 PROCEDURE — 99211 OFF/OP EST MAY X REQ PHY/QHP: CPT

## 2018-07-11 ASSESSMENT — PAIN SCALES - GENERAL: PAINLEVEL_OUTOF10: 0

## 2018-07-11 NOTE — PROGRESS NOTES
 albuterol sulfate  (90 Base) MCG/ACT inhaler Inhale 2 puffs into the lungs every 4 hours as needed for Wheezing      montelukast (SINGULAIR) 10 MG tablet Take 10 mg by mouth nightly      gabapentin (NEURONTIN) 100 MG capsule Take 200 mg by mouth 3 times daily. .      polyethylene glycol (GLYCOLAX) powder Take 17 g by mouth daily      traMADol (ULTRAM) 50 MG tablet Take 50 mg by mouth every 6 hours as needed for Pain. [place] ALENDRONATE SODIUM PO Take 1 tablet by mouth once a week       mirtazapine (REMERON) 15 MG tablet Take 15 mg by mouth nightly      diltiazem (CARDIZEM CD) 240 MG extended release capsule Take 240 mg by mouth daily      Fexofenadine HCl (ALLEGRA ALLERGY PO) Take 1 tablet by mouth daily       albuterol (PROVENTIL) (2.5 MG/3ML) 0.083% nebulizer solution Take 2.5 mg by nebulization every 6 hours as needed for Wheezing       No current facility-administered medications on file prior to encounter. REVIEW OF SYSTEMS:     Constitutional: no fever, no night sweats, no fatigue. Head: no head ache , no head injury, no migranes. Eye: no blurring of vision, no double vision. Ears: no hearing difficulty, no tinnitus  Mouth/throat: no ulceration, dental caries , dysphagia  Lungs: no cough, no shortness of breath, no wheeze  CVS: no palpitation, no chest pain, no shortness of breath  GI: no abdominal pain, no nausea , no vomiting, no constipation  STONEY: no dysuria, frequency and urgency, no hematuria, no kidney stones  Musculoskeletal: no joint pain, swelling , stiffness,  Endocrine: no polyuria, polydipsia, no cold or heat intolerance  Hematology: no anemia, no easy brusing or bleeding, no hx of clotting disorder  Dermatology: no skin rash, no eczema, no pruritis,  Psychiatry: no depression, no anxiety,no panic attacks, no suicide ideation        PHYSICAL EXAM      height is 5' 3\" (1.6 m) and weight is 208 lb 11.2 oz (94.7 kg). Her oral temperature is 98.6 °F (37 °C).  Her blood

## 2018-09-05 ENCOUNTER — ANESTHESIA EVENT (OUTPATIENT)
Dept: ENDOSCOPY | Age: 73
End: 2018-09-05
Payer: MEDICARE

## 2018-09-05 ENCOUNTER — ANESTHESIA (OUTPATIENT)
Dept: ENDOSCOPY | Age: 73
End: 2018-09-05
Payer: MEDICARE

## 2018-09-05 ENCOUNTER — HOSPITAL ENCOUNTER (OUTPATIENT)
Age: 73
Setting detail: OUTPATIENT SURGERY
Discharge: HOME | End: 2018-09-05
Attending: INTERNAL MEDICINE | Admitting: INTERNAL MEDICINE
Payer: MEDICARE

## 2018-09-05 VITALS
OXYGEN SATURATION: 93 % | DIASTOLIC BLOOD PRESSURE: 65 MMHG | RESPIRATION RATE: 34 BRPM | SYSTOLIC BLOOD PRESSURE: 112 MMHG

## 2018-09-05 VITALS
BODY MASS INDEX: 33.88 KG/M2 | HEIGHT: 63 IN | HEART RATE: 99 BPM | TEMPERATURE: 96.7 F | DIASTOLIC BLOOD PRESSURE: 87 MMHG | OXYGEN SATURATION: 100 % | RESPIRATION RATE: 20 BRPM | WEIGHT: 191.2 LBS | SYSTOLIC BLOOD PRESSURE: 139 MMHG

## 2018-09-05 PROCEDURE — 2709999900 HC NON-CHARGEABLE SUPPLY: Performed by: INTERNAL MEDICINE

## 2018-09-05 PROCEDURE — 3700000001 HC ADD 15 MINUTES (ANESTHESIA): Performed by: INTERNAL MEDICINE

## 2018-09-05 PROCEDURE — 2580000003 HC RX 258: Performed by: INTERNAL MEDICINE

## 2018-09-05 PROCEDURE — 6370000000 HC RX 637 (ALT 250 FOR IP): Performed by: NURSE ANESTHETIST, CERTIFIED REGISTERED

## 2018-09-05 PROCEDURE — 6360000002 HC RX W HCPCS: Performed by: NURSE ANESTHETIST, CERTIFIED REGISTERED

## 2018-09-05 PROCEDURE — 7100000000 HC PACU RECOVERY - FIRST 15 MIN: Performed by: INTERNAL MEDICINE

## 2018-09-05 PROCEDURE — 7100000001 HC PACU RECOVERY - ADDTL 15 MIN: Performed by: INTERNAL MEDICINE

## 2018-09-05 PROCEDURE — 3700000000 HC ANESTHESIA ATTENDED CARE: Performed by: INTERNAL MEDICINE

## 2018-09-05 PROCEDURE — 3609027000 HC COLONOSCOPY: Performed by: INTERNAL MEDICINE

## 2018-09-05 RX ORDER — SODIUM CHLORIDE 450 MG/100ML
INJECTION, SOLUTION INTRAVENOUS CONTINUOUS
Status: DISCONTINUED | OUTPATIENT
Start: 2018-09-05 | End: 2018-09-05 | Stop reason: HOSPADM

## 2018-09-05 RX ORDER — PROPOFOL 10 MG/ML
INJECTION, EMULSION INTRAVENOUS PRN
Status: DISCONTINUED | OUTPATIENT
Start: 2018-09-05 | End: 2018-09-05 | Stop reason: SDUPTHER

## 2018-09-05 RX ORDER — ALBUTEROL SULFATE 90 UG/1
AEROSOL, METERED RESPIRATORY (INHALATION) PRN
Status: DISCONTINUED | OUTPATIENT
Start: 2018-09-05 | End: 2018-09-05 | Stop reason: SDUPTHER

## 2018-09-05 RX ADMIN — SODIUM CHLORIDE: 4.5 INJECTION, SOLUTION INTRAVENOUS at 09:24

## 2018-09-05 RX ADMIN — PROPOFOL 300 MG: 10 INJECTION, EMULSION INTRAVENOUS at 09:55

## 2018-09-05 RX ADMIN — SODIUM CHLORIDE: 4.5 INJECTION, SOLUTION INTRAVENOUS at 09:45

## 2018-09-05 RX ADMIN — Medication 3 PUFF: at 09:45

## 2018-09-05 ASSESSMENT — PAIN - FUNCTIONAL ASSESSMENT: PAIN_FUNCTIONAL_ASSESSMENT: 0-10

## 2018-09-05 NOTE — OP NOTE
Sedation/Analgesia History & Physical    Patient: Armani Aleman : 1945  [facility] Rec#: [id number] Acc#: [id number]   Provider Performing Procedure: Jose Jenkins  Primary Care Physician: Baltazar Garrison MD    PRE-PROCEDURE   Full CODE [x]Yes  DNR-CCA/DNR-CC []Yes   Brief History/Pre-Procedure Diagnosis:rectal bleeding          MEDICAL HISTORY         has a past medical history of Arthritis; Asthma; Hypertension; Osteoporosis; and Rheumatoid arthritis ([address] SURGICAL HISTORY   has a past surgical history that includes joint replacement (Bilateral, ) and Colonoscopy. Additional information:       ALLERGIES   Allergies as of 2018 - Review Complete 2018   Allergen Reaction Noted    Lisinopril Shortness Of Breath 2018     Additional information:       MEDICATIONS   Coumadin Use Last 7 Days [x]No []Yes  Antiplatelet drug therapy use last 7 days  [x]No []Yes  Other anticoagulant use last 7 days  [x]No []Yes    Current Facility-Administered Medications:     0.45 % sodium chloride infusion, , Intravenous, Continuous, Jaye Acosta MD    0.45 % sodium chloride infusion, , Intravenous, Continuous, Jaye Acosta MD, Last Rate: 75 mL/hr at 18 2883    Facility-Administered Medications Ordered in Other Encounters:     albuterol sulfate  (90 Base) MCG/ACT inhaler, , , PRN, SAILAJA Watson CRNA, 3 puff at 18 0945    propofol injection, , , PRN, SAILAJA Watson CRNA, 300 mg at 18 0955  Prior to Admission medications    Medication Sig Start Date End Date Taking?  Authorizing Provider   glimepiride (AMARYL) 2 MG tablet Take 1 tablet by mouth daily (with breakfast) 18  Yes Roddy Hartman MD   metFORMIN (GLUCOPHAGE) 500 MG tablet Take 1 tablet by mouth 2 times daily (with meals) 18  Yes Roddy Hartman MD   vitamin D (CHOLECALCIFEROL) 1000 UNIT TABS tablet Take 1 tablet by mouth daily 18  Yes Roddy Hartman MD   aspirin EC 81 MG EC tablet Take 1 tablet by mouth daily 6/25/18  Yes Sabino Bearden MD   fluticasone-salmeterol (ADVAIR) 250-50 MCG/DOSE AEPB Inhale 1 puff into the lungs 2 times daily   Yes Historical Provider, MD   albuterol sulfate  (90 Base) MCG/ACT inhaler Inhale 2 puffs into the lungs every 4 hours as needed for Wheezing   Yes Historical Provider, MD   montelukast (SINGULAIR) 10 MG tablet Take 10 mg by mouth nightly   Yes Historical Provider, MD   gabapentin (NEURONTIN) 100 MG capsule Take 200 mg by mouth 3 times daily. .   Yes Historical Provider, MD   polyethylene glycol (GLYCOLAX) powder Take 17 g by mouth daily   Yes Historical Provider, MD   traMADol (ULTRAM) 50 MG tablet Take 50 mg by mouth every 6 hours as needed for Pain. .   Yes Historical Provider, MD   ALENDRONATE SODIUM PO Take 1 tablet by mouth once a week    Yes Historical Provider, MD   mirtazapine (REMERON) 15 MG tablet Take 15 mg by mouth nightly   Yes Historical Provider, MD   diltiazem (CARDIZEM CD) 240 MG extended release capsule Take 240 mg by mouth daily   Yes Historical Provider, MD   Fexofenadine HCl (ALLEGRA ALLERGY PO) Take 1 tablet by mouth daily    Yes Historical Provider, MD   albuterol (PROVENTIL) (2.5 MG/3ML) 0.083% nebulizer solution Take 2.5 mg by nebulization every 6 hours as needed for Wheezing   Yes Historical Provider, MD     Additional information:       PHYSICAL:   Heart:  [x]Regular rate and rhythm  []Other:    Lungs:  [x]Clear    []Other:    Abdomen: [x]Soft    []Other:    Mental Status: [x]Alert & Oriented  []Other:      VITAL SIGNS   Patient Vitals for the past 24 hrs:   BP Temp Temp src Pulse Resp SpO2 Height Weight   09/05/18 0915 (!) 143/78 97.5 °F (36.4 °C) Temporal 96 20 95 % 5' 3\" (1.6 m) 191 lb 3.2 oz (86.7 kg)       PLANNED PROCEDURE   []EGD  [x]Colonoscopy []Flex Sigmoid     Consent: I have discussed with the patient and/or the patient representative the indication, alternatives, and the possible risks and/or complications of the planned procedure and the anesthesia methods. The patient and/or patient representative appear to understand and agree to proceed. SEDATION  Planned agent:[x]Midazolam []Meperidine [x]Sublimaze []Morphine  []Diazepam  []Propofol   ASA Classification: Class 3 - A patient with severe systemic disease that limits activity but is not incapacitating    Monitoring and Safety: The patient will be placed on a cardiac monitor and vital signs, pulse oximetry and level of consciousness will be continuously evaluated throughout the procedure. The patient will be closely monitored until recovery from the medications is complete and the patient has returned to baseline status. Respiratory therapy will be on standby during the procedure. [x]Pre-procedure diagnostic studies complete and results available. Comment:    [x]Previous sedation/anesthesia experiences assessed. Comment:    [x]The patient is an appropriate candidate to undergo the planned procedure sedation and anesthesia. (Refer to nursing sedation/analgesia documentation record)  [x]Formulation and discussion of sedation/procedure plan, risks, and expectations with patient and/or responsible adult completed. [x]Patient examined immediately prior to the procedure.  (Refer to nursing sedation/analgesia documentation record)    Ema Swan MD   Electronically signed 9/5/2018

## 2018-09-05 NOTE — PROGRESS NOTES
Awake and talking. Passing flatus. Dr Almanza English in to speak with pt and son regarding findings and plan of care.

## 2018-09-05 NOTE — ANESTHESIA POSTPROCEDURE EVALUATION
Department of Anesthesiology  Postprocedure Note    Patient: Nelson Fagan  MRN: [id number]  YOB: 1945  Date of evaluation: 9/5/2018  Time:  10:30 AM     Procedure Summary     Date:  09/05/18 Room / Location:  [facility] / [facility]    Anesthesia Start:  9546 Anesthesia Stop:  9177    Procedure:  COLONOSCOPY (N/A Anus) Diagnosis:  (HEMORRHAGE OF ANUS AND RECTUM)    Surgeon:  Marie Mulligan MD Responsible Provider:  Rebeca Goddard DO    Anesthesia Type:  MAC ASA Status:  3          Anesthesia Type: MAC    Waleska Phase I: Waleska Score: 10    Waleska Phase II:      Last vitals: Reviewed and per EMR flowsheets.        Anesthesia Post Evaluation    Patient location during evaluation: bedside  Patient participation: complete - patient participated  Level of consciousness: awake  Airway patency: patent  Nausea & Vomiting: no vomiting and no nausea  Complications: no  Cardiovascular status: hemodynamically stable  Respiratory status: acceptable  Hydration status: stable

## 2018-09-05 NOTE — ANESTHESIA PRE PROCEDURE
176/81       NPO Status: Time of last liquid consumption: 2100                        Time of last solid consumption: 2100                        Date of last liquid consumption: 09/04/18                        Date of last solid food consumption: 09/03/18    BMI:   Wt Readings from Last 3 Encounters:   09/05/18 191 lb 3.2 oz (86.7 kg)   07/11/18 208 lb 11.2 oz (94.7 kg)   06/27/18 202 lb 6 oz (91.8 kg)     Body mass index is 33.87 kg/m². CBC:   Lab Results   Component Value Date    WBC 10.1 06/23/2018    RBC 5.54 06/23/2018    HGB 12.8 06/23/2018    HCT 40.3 06/23/2018    MCV 72.8 06/23/2018    RDW 16.0 06/23/2018     06/23/2018       CMP:   Lab Results   Component Value Date     06/23/2018    K 4.7 06/23/2018     06/23/2018    CO2 27 06/23/2018    BUN 14 06/23/2018    CREATININE 0.8 06/23/2018    LABGLOM 85 06/23/2018    GLUCOSE 186 06/23/2018    CALCIUM 9.9 06/23/2018       POC Tests: No results for input(s): POCGLU, POCNA, POCK, POCCL, POCBUN, POCHEMO, POCHCT in the last 72 hours. Coags: No results found for: PROTIME, INR, APTT    HCG (If Applicable): No results found for: PREGTESTUR, PREGSERUM, HCG, HCGQUANT     ABGs: No results found for: PHART, PO2ART, FWZ2SZU, VEA4QEK, BEART, O7IHAHEX     Type & Screen (If Applicable):  No results found for: LABABO, LABRH    Anesthesia Evaluation    Airway: Mallampati: II  TM distance: >3 FB     Mouth opening: > = 3 FB Dental:          Pulmonary:   (+) COPD:  asthma:                            Cardiovascular:    (+) hypertension:, past MI:,                   Neuro/Psych:               GI/Hepatic/Renal:             Endo/Other:    (+) Diabetes, : arthritis:., .                 Abdominal:   (+) obese,         Vascular:                                        Anesthesia Plan      MAC     ASA 3       Induction: intravenous. Anesthetic plan and risks discussed with patient. Plan discussed with attending.                   SAILAJA Tan - CRNA

## 2018-10-05 ENCOUNTER — TELEPHONE (OUTPATIENT)
Dept: CARDIOLOGY CLINIC | Age: 73
End: 2018-10-05

## 2018-10-09 ENCOUNTER — TELEPHONE (OUTPATIENT)
Dept: CARDIOLOGY CLINIC | Age: 73
End: 2018-10-09

## (undated) DEVICE — LINER SUCT CANSTR 1500CC SEMI RIG W/ POR HYDROPHOBIC SHUT

## (undated) DEVICE — CATHETER ETER IV 22GA L1IN POLYUR STR RADPQ INTROCAN SFTY

## (undated) DEVICE — Device: Brand: DEFENDO VALVE AND CONNECTOR KIT

## (undated) DEVICE — ENDO KIT: Brand: MEDLINE INDUSTRIES, INC.

## (undated) DEVICE — IV START KIT: Brand: MEDLINE INDUSTRIES, INC.

## (undated) DEVICE — SET LNR RED GRN W/ BASE CLEANASCOPE

## (undated) DEVICE — SOLUTION IV 1000ML 0.45% SOD CHL PH 5 INJ USP VIAFLX PLAS

## (undated) DEVICE — TUBING IV STOPCOCK 48 CM 3 W

## (undated) DEVICE — SET ADMIN 25ML L117IN PMP MOD CK VLV RLER CLMP 2 SMRTSITE

## (undated) DEVICE — CONNECTOR TBNG AUX H2O JET DISP FOR OLY 160/180 SER